# Patient Record
Sex: FEMALE | Race: ASIAN | NOT HISPANIC OR LATINO
[De-identification: names, ages, dates, MRNs, and addresses within clinical notes are randomized per-mention and may not be internally consistent; named-entity substitution may affect disease eponyms.]

---

## 2020-07-17 ENCOUNTER — LABORATORY RESULT (OUTPATIENT)
Age: 30
End: 2020-07-17

## 2020-07-17 ENCOUNTER — RESULT REVIEW (OUTPATIENT)
Age: 30
End: 2020-07-17

## 2020-07-17 ENCOUNTER — OUTPATIENT (OUTPATIENT)
Dept: OUTPATIENT SERVICES | Facility: HOSPITAL | Age: 30
LOS: 1 days | End: 2020-07-17
Payer: MEDICAID

## 2020-07-17 ENCOUNTER — APPOINTMENT (OUTPATIENT)
Dept: OBGYN | Facility: HOSPITAL | Age: 30
End: 2020-07-17

## 2020-07-17 ENCOUNTER — NON-APPOINTMENT (OUTPATIENT)
Age: 30
End: 2020-07-17

## 2020-07-17 VITALS
WEIGHT: 142 LBS | HEART RATE: 83 BPM | DIASTOLIC BLOOD PRESSURE: 65 MMHG | SYSTOLIC BLOOD PRESSURE: 120 MMHG | HEIGHT: 59 IN | TEMPERATURE: 97.8 F | BODY MASS INDEX: 28.63 KG/M2

## 2020-07-17 DIAGNOSIS — Z34.00 ENCOUNTER FOR SUPERVISION OF NORMAL FIRST PREGNANCY, UNSPECIFIED TRIMESTER: ICD-10-CM

## 2020-07-17 DIAGNOSIS — Z80.8 FAMILY HISTORY OF MALIGNANT NEOPLASM OF OTHER ORGANS OR SYSTEMS: ICD-10-CM

## 2020-07-17 DIAGNOSIS — Z78.9 OTHER SPECIFIED HEALTH STATUS: ICD-10-CM

## 2020-07-17 LAB
ALBUMIN SERPL ELPH-MCNC: 4 G/DL — SIGNIFICANT CHANGE UP (ref 3.3–5)
ALP SERPL-CCNC: 44 U/L — SIGNIFICANT CHANGE UP (ref 40–120)
ALT FLD-CCNC: 30 U/L — SIGNIFICANT CHANGE UP (ref 4–33)
ANION GAP SERPL CALC-SCNC: 13 MMO/L — SIGNIFICANT CHANGE UP (ref 7–14)
APPEARANCE UR: SIGNIFICANT CHANGE UP
AST SERPL-CCNC: 29 U/L — SIGNIFICANT CHANGE UP (ref 4–32)
BACTERIA # UR AUTO: HIGH
BASOPHILS # BLD AUTO: 0.03 K/UL — SIGNIFICANT CHANGE UP (ref 0–0.2)
BASOPHILS NFR BLD AUTO: 0.3 % — SIGNIFICANT CHANGE UP (ref 0–2)
BILIRUB SERPL-MCNC: 0.5 MG/DL — SIGNIFICANT CHANGE UP (ref 0.2–1.2)
BILIRUB UR-MCNC: NEGATIVE — SIGNIFICANT CHANGE UP
BLD GP AB SCN SERPL QL: NEGATIVE — SIGNIFICANT CHANGE UP
BLOOD UR QL VISUAL: NEGATIVE — SIGNIFICANT CHANGE UP
BUN SERPL-MCNC: 8 MG/DL — SIGNIFICANT CHANGE UP (ref 7–23)
CALCIUM SERPL-MCNC: 9.1 MG/DL — SIGNIFICANT CHANGE UP (ref 8.4–10.5)
CHLORIDE SERPL-SCNC: 102 MMOL/L — SIGNIFICANT CHANGE UP (ref 98–107)
CO2 SERPL-SCNC: 21 MMOL/L — LOW (ref 22–31)
COLOR SPEC: YELLOW — SIGNIFICANT CHANGE UP
CREAT SERPL-MCNC: 0.47 MG/DL — LOW (ref 0.5–1.3)
EOSINOPHIL # BLD AUTO: 0.14 K/UL — SIGNIFICANT CHANGE UP (ref 0–0.5)
EOSINOPHIL NFR BLD AUTO: 1.5 % — SIGNIFICANT CHANGE UP (ref 0–6)
EPI CELLS # UR: SIGNIFICANT CHANGE UP
GLUCOSE SERPL-MCNC: 53 MG/DL — LOW (ref 70–99)
GLUCOSE UR-MCNC: NEGATIVE — SIGNIFICANT CHANGE UP
HCT VFR BLD CALC: 35.9 % — SIGNIFICANT CHANGE UP (ref 34.5–45)
HGB BLD-MCNC: 12.3 G/DL — SIGNIFICANT CHANGE UP (ref 11.5–15.5)
HYALINE CASTS # UR AUTO: HIGH
IMM GRANULOCYTES NFR BLD AUTO: 0.4 % — SIGNIFICANT CHANGE UP (ref 0–1.5)
KETONES UR-MCNC: NEGATIVE — SIGNIFICANT CHANGE UP
LEUKOCYTE ESTERASE UR-ACNC: NEGATIVE — SIGNIFICANT CHANGE UP
LYMPHOCYTES # BLD AUTO: 2.5 K/UL — SIGNIFICANT CHANGE UP (ref 1–3.3)
LYMPHOCYTES # BLD AUTO: 26.5 % — SIGNIFICANT CHANGE UP (ref 13–44)
MCHC RBC-ENTMCNC: 28.7 PG — SIGNIFICANT CHANGE UP (ref 27–34)
MCHC RBC-ENTMCNC: 34.3 % — SIGNIFICANT CHANGE UP (ref 32–36)
MCV RBC AUTO: 83.9 FL — SIGNIFICANT CHANGE UP (ref 80–100)
MONOCYTES # BLD AUTO: 0.51 K/UL — SIGNIFICANT CHANGE UP (ref 0–0.9)
MONOCYTES NFR BLD AUTO: 5.4 % — SIGNIFICANT CHANGE UP (ref 2–14)
NEUTROPHILS # BLD AUTO: 6.22 K/UL — SIGNIFICANT CHANGE UP (ref 1.8–7.4)
NEUTROPHILS NFR BLD AUTO: 65.9 % — SIGNIFICANT CHANGE UP (ref 43–77)
NITRITE UR-MCNC: NEGATIVE — SIGNIFICANT CHANGE UP
NRBC # FLD: 0 K/UL — SIGNIFICANT CHANGE UP (ref 0–0)
PH UR: 7 — SIGNIFICANT CHANGE UP (ref 5–8)
PLATELET # BLD AUTO: 244 K/UL — SIGNIFICANT CHANGE UP (ref 150–400)
PMV BLD: 12 FL — SIGNIFICANT CHANGE UP (ref 7–13)
POTASSIUM SERPL-MCNC: 3.2 MMOL/L — LOW (ref 3.5–5.3)
POTASSIUM SERPL-SCNC: 3.2 MMOL/L — LOW (ref 3.5–5.3)
PROT SERPL-MCNC: 7.2 G/DL — SIGNIFICANT CHANGE UP (ref 6–8.3)
PROT UR-MCNC: 30 — SIGNIFICANT CHANGE UP
RBC # BLD: 4.28 M/UL — SIGNIFICANT CHANGE UP (ref 3.8–5.2)
RBC # FLD: 12.8 % — SIGNIFICANT CHANGE UP (ref 10.3–14.5)
RBC CASTS # UR COMP ASSIST: SIGNIFICANT CHANGE UP (ref 0–?)
RH IG SCN BLD-IMP: POSITIVE — SIGNIFICANT CHANGE UP
SODIUM SERPL-SCNC: 136 MMOL/L — SIGNIFICANT CHANGE UP (ref 135–145)
SP GR SPEC: 1.03 — SIGNIFICANT CHANGE UP (ref 1–1.04)
SQUAMOUS # UR AUTO: SIGNIFICANT CHANGE UP
URATE SERPL-MCNC: 2.8 MG/DL — SIGNIFICANT CHANGE UP (ref 2.5–7)
UROBILINOGEN FLD QL: NORMAL — SIGNIFICANT CHANGE UP
WBC # BLD: 9.44 K/UL — SIGNIFICANT CHANGE UP (ref 3.8–10.5)
WBC # FLD AUTO: 9.44 K/UL — SIGNIFICANT CHANGE UP (ref 3.8–10.5)
WBC UR QL: SIGNIFICANT CHANGE UP (ref 0–?)

## 2020-07-17 PROCEDURE — G0452: CPT

## 2020-07-18 LAB
24R-OH-CALCIDIOL SERPL-MCNC: 29 NG/ML — LOW (ref 30–80)
C TRACH RRNA SPEC QL NAA+PROBE: SIGNIFICANT CHANGE UP
HBV SURFACE AG SER-ACNC: NONREACTIVE — SIGNIFICANT CHANGE UP
HCV AB S/CO SERPL IA: 0.09 S/CO — SIGNIFICANT CHANGE UP (ref 0–0.99)
HCV AB SERPL-IMP: SIGNIFICANT CHANGE UP
HIV 1+2 AB+HIV1 P24 AG SERPL QL IA: SIGNIFICANT CHANGE UP
MUV AB SER-ACNC: POSITIVE — SIGNIFICANT CHANGE UP
MUV IGG FLD-ACNC: 99.7 AU/ML — SIGNIFICANT CHANGE UP
N GONORRHOEA RRNA SPEC QL NAA+PROBE: SIGNIFICANT CHANGE UP
RUBV IGG SER-ACNC: 18.1 INDEX — SIGNIFICANT CHANGE UP
RUBV IGG SER-IMP: POSITIVE — SIGNIFICANT CHANGE UP
SARS-COV-2 IGG SERPL QL IA: POSITIVE
SARS-COV-2 IGM SERPL IA-ACNC: 139 INDEX — HIGH
SPECIMEN SOURCE: SIGNIFICANT CHANGE UP
T PALLIDUM AB TITR SER: NEGATIVE — SIGNIFICANT CHANGE UP
VZV IGG FLD QL IA: 896.1 INDEX — SIGNIFICANT CHANGE UP
VZV IGG FLD QL IA: POSITIVE — SIGNIFICANT CHANGE UP

## 2020-07-19 LAB
CULTURE RESULTS: SIGNIFICANT CHANGE UP
GAMMA INTERFERON BACKGROUND BLD IA-ACNC: 0.05 IU/ML — SIGNIFICANT CHANGE UP
M TB IFN-G BLD-IMP: NEGATIVE — SIGNIFICANT CHANGE UP
M TB IFN-G CD4+ BCKGRND COR BLD-ACNC: 0.01 IU/ML — SIGNIFICANT CHANGE UP
M TB IFN-G CD4+CD8+ BCKGRND COR BLD-ACNC: 0.03 IU/ML — SIGNIFICANT CHANGE UP
QUANT TB PLUS MITOGEN MINUS NIL: 8.36 IU/ML — SIGNIFICANT CHANGE UP
SPECIMEN SOURCE: SIGNIFICANT CHANGE UP

## 2020-07-20 LAB
HGB A MFR BLD: 96.5 % — SIGNIFICANT CHANGE UP
HGB A2 MFR BLD: 2.9 % — SIGNIFICANT CHANGE UP (ref 2.4–3.5)
HGB ELECT COMMENT: SIGNIFICANT CHANGE UP
HGB F MFR BLD: < 1 % — SIGNIFICANT CHANGE UP (ref 0–1.5)
LEAD SERPL-MCNC: 1 UG/DL — SIGNIFICANT CHANGE UP (ref 0–4)

## 2020-07-22 LAB — CYTOLOGY SPEC DOC CYTO: SIGNIFICANT CHANGE UP

## 2020-07-23 ENCOUNTER — LABORATORY RESULT (OUTPATIENT)
Age: 30
End: 2020-07-23

## 2020-07-24 ENCOUNTER — OUTPATIENT (OUTPATIENT)
Dept: OUTPATIENT SERVICES | Facility: HOSPITAL | Age: 30
LOS: 1 days | End: 2020-07-24

## 2020-07-24 ENCOUNTER — ASOB RESULT (OUTPATIENT)
Age: 30
End: 2020-07-24

## 2020-07-24 ENCOUNTER — APPOINTMENT (OUTPATIENT)
Dept: OBGYN | Facility: HOSPITAL | Age: 30
End: 2020-07-24

## 2020-07-24 ENCOUNTER — NON-APPOINTMENT (OUTPATIENT)
Age: 30
End: 2020-07-24

## 2020-07-24 ENCOUNTER — APPOINTMENT (OUTPATIENT)
Dept: ANTEPARTUM | Facility: CLINIC | Age: 30
End: 2020-07-24
Payer: MEDICAID

## 2020-07-24 VITALS
HEART RATE: 82 BPM | BODY MASS INDEX: 28.88 KG/M2 | WEIGHT: 143 LBS | DIASTOLIC BLOOD PRESSURE: 64 MMHG | SYSTOLIC BLOOD PRESSURE: 108 MMHG

## 2020-07-24 PROCEDURE — 76813 OB US NUCHAL MEAS 1 GEST: CPT | Mod: 26

## 2020-07-24 PROCEDURE — 76801 OB US < 14 WKS SINGLE FETUS: CPT | Mod: 26

## 2020-07-25 LAB — CFTR MUT ANL BLD/T: NEGATIVE — SIGNIFICANT CHANGE UP

## 2020-07-29 LAB
1ST TRIMESTER DATA: SIGNIFICANT CHANGE UP
ADDENDUM DOC: SIGNIFICANT CHANGE UP
AFP SERPL-ACNC: SIGNIFICANT CHANGE UP
B-HCG FREE SERPL-MCNC: SIGNIFICANT CHANGE UP
CLINICAL BIOCHEMIST REVIEW: SIGNIFICANT CHANGE UP
CLINICAL BIOCHEMIST REVIEW: SIGNIFICANT CHANGE UP
DEMOGRAPHIC DATA: SIGNIFICANT CHANGE UP
NT: SIGNIFICANT CHANGE UP
PAPP-A SERPL-ACNC: SIGNIFICANT CHANGE UP
SCREEN-FOOTER: SIGNIFICANT CHANGE UP
SCREEN-RECOMMENDATIONS: SIGNIFICANT CHANGE UP

## 2020-08-07 DIAGNOSIS — Z34.92 ENCOUNTER FOR SUPERVISION OF NORMAL PREGNANCY, UNSPECIFIED, SECOND TRIMESTER: ICD-10-CM

## 2020-08-21 ENCOUNTER — LABORATORY RESULT (OUTPATIENT)
Age: 30
End: 2020-08-21

## 2020-08-21 ENCOUNTER — APPOINTMENT (OUTPATIENT)
Dept: OBGYN | Facility: HOSPITAL | Age: 30
End: 2020-08-21

## 2020-08-21 ENCOUNTER — NON-APPOINTMENT (OUTPATIENT)
Age: 30
End: 2020-08-21

## 2020-08-21 ENCOUNTER — OUTPATIENT (OUTPATIENT)
Dept: OUTPATIENT SERVICES | Facility: HOSPITAL | Age: 30
LOS: 1 days | End: 2020-08-21

## 2020-08-21 VITALS
SYSTOLIC BLOOD PRESSURE: 107 MMHG | WEIGHT: 146 LBS | DIASTOLIC BLOOD PRESSURE: 55 MMHG | HEART RATE: 89 BPM | TEMPERATURE: 97.8 F | BODY MASS INDEX: 29.49 KG/M2

## 2020-08-24 DIAGNOSIS — Z34.00 ENCOUNTER FOR SUPERVISION OF NORMAL FIRST PREGNANCY, UNSPECIFIED TRIMESTER: ICD-10-CM

## 2020-08-24 LAB
1ST TRIMESTER DATA: SIGNIFICANT CHANGE UP
2ND TRIMESTER DATA: SIGNIFICANT CHANGE UP
AFP SERPL-ACNC: SIGNIFICANT CHANGE UP
AFP SERPL-ACNC: SIGNIFICANT CHANGE UP
B-HCG FREE SERPL-MCNC: SIGNIFICANT CHANGE UP
B-HCG FREE SERPL-MCNC: SIGNIFICANT CHANGE UP
CLINICAL BIOCHEMIST REVIEW: SIGNIFICANT CHANGE UP
DEMOGRAPHIC DATA: SIGNIFICANT CHANGE UP
INHIBIN A SERPL-MCNC: SIGNIFICANT CHANGE UP
NT: SIGNIFICANT CHANGE UP
PAPP-A SERPL-ACNC: SIGNIFICANT CHANGE UP
SCREEN-FOOTER: SIGNIFICANT CHANGE UP
U ESTRIOL SERPL-SCNC: SIGNIFICANT CHANGE UP

## 2020-09-25 ENCOUNTER — ASOB RESULT (OUTPATIENT)
Age: 30
End: 2020-09-25

## 2020-09-25 ENCOUNTER — APPOINTMENT (OUTPATIENT)
Dept: ANTEPARTUM | Facility: CLINIC | Age: 30
End: 2020-09-25

## 2020-10-09 ENCOUNTER — APPOINTMENT (OUTPATIENT)
Dept: OBGYN | Facility: HOSPITAL | Age: 30
End: 2020-10-09

## 2020-10-09 ENCOUNTER — OUTPATIENT (OUTPATIENT)
Dept: OUTPATIENT SERVICES | Facility: HOSPITAL | Age: 30
LOS: 1 days | End: 2020-10-09

## 2020-10-09 ENCOUNTER — LABORATORY RESULT (OUTPATIENT)
Age: 30
End: 2020-10-09

## 2020-10-09 ENCOUNTER — NON-APPOINTMENT (OUTPATIENT)
Age: 30
End: 2020-10-09

## 2020-10-09 VITALS
HEART RATE: 97 BPM | DIASTOLIC BLOOD PRESSURE: 65 MMHG | WEIGHT: 157 LBS | BODY MASS INDEX: 31.71 KG/M2 | SYSTOLIC BLOOD PRESSURE: 115 MMHG | TEMPERATURE: 97 F

## 2020-10-09 LAB
POTASSIUM SERPL-MCNC: 3.6 MMOL/L — SIGNIFICANT CHANGE UP (ref 3.5–5.3)
POTASSIUM SERPL-SCNC: 3.6 MMOL/L — SIGNIFICANT CHANGE UP (ref 3.5–5.3)

## 2020-10-13 DIAGNOSIS — Z34.92 ENCOUNTER FOR SUPERVISION OF NORMAL PREGNANCY, UNSPECIFIED, SECOND TRIMESTER: ICD-10-CM

## 2020-10-30 ENCOUNTER — APPOINTMENT (OUTPATIENT)
Dept: OBGYN | Facility: HOSPITAL | Age: 30
End: 2020-10-30

## 2020-11-06 ENCOUNTER — APPOINTMENT (OUTPATIENT)
Dept: OBGYN | Facility: HOSPITAL | Age: 30
End: 2020-11-06

## 2020-11-10 ENCOUNTER — APPOINTMENT (OUTPATIENT)
Dept: OBGYN | Facility: HOSPITAL | Age: 30
End: 2020-11-10

## 2020-11-11 ENCOUNTER — LABORATORY RESULT (OUTPATIENT)
Age: 30
End: 2020-11-11

## 2020-11-11 ENCOUNTER — APPOINTMENT (OUTPATIENT)
Dept: OBGYN | Facility: HOSPITAL | Age: 30
End: 2020-11-11

## 2020-11-11 ENCOUNTER — OUTPATIENT (OUTPATIENT)
Dept: OUTPATIENT SERVICES | Facility: HOSPITAL | Age: 30
LOS: 1 days | End: 2020-11-11

## 2020-11-11 ENCOUNTER — NON-APPOINTMENT (OUTPATIENT)
Age: 30
End: 2020-11-11

## 2020-11-11 VITALS — TEMPERATURE: 98.7 F

## 2020-11-11 VITALS — DIASTOLIC BLOOD PRESSURE: 65 MMHG | WEIGHT: 164 LBS | BODY MASS INDEX: 33.12 KG/M2 | SYSTOLIC BLOOD PRESSURE: 118 MMHG

## 2020-11-11 VITALS — HEART RATE: 84 BPM

## 2020-11-11 LAB
BASOPHILS # BLD AUTO: 0.02 K/UL — SIGNIFICANT CHANGE UP (ref 0–0.2)
BASOPHILS NFR BLD AUTO: 0.2 % — SIGNIFICANT CHANGE UP (ref 0–2)
EOSINOPHIL # BLD AUTO: 0.11 K/UL — SIGNIFICANT CHANGE UP (ref 0–0.5)
EOSINOPHIL NFR BLD AUTO: 1.1 % — SIGNIFICANT CHANGE UP (ref 0–6)
GLUCOSE PRE/P GLC SERPL-SCNC: 114 — SIGNIFICANT CHANGE UP (ref 65–115)
HCT VFR BLD CALC: 33.5 % — LOW (ref 34.5–45)
HGB BLD-MCNC: 10.9 G/DL — LOW (ref 11.5–15.5)
IMM GRANULOCYTES NFR BLD AUTO: 0.9 % — SIGNIFICANT CHANGE UP (ref 0–1.5)
LYMPHOCYTES # BLD AUTO: 1.8 K/UL — SIGNIFICANT CHANGE UP (ref 1–3.3)
LYMPHOCYTES # BLD AUTO: 18.3 % — SIGNIFICANT CHANGE UP (ref 13–44)
MCHC RBC-ENTMCNC: 28.5 PG — SIGNIFICANT CHANGE UP (ref 27–34)
MCHC RBC-ENTMCNC: 32.5 % — SIGNIFICANT CHANGE UP (ref 32–36)
MCV RBC AUTO: 87.5 FL — SIGNIFICANT CHANGE UP (ref 80–100)
MONOCYTES # BLD AUTO: 0.59 K/UL — SIGNIFICANT CHANGE UP (ref 0–0.9)
MONOCYTES NFR BLD AUTO: 6 % — SIGNIFICANT CHANGE UP (ref 2–14)
NEUTROPHILS # BLD AUTO: 7.25 K/UL — SIGNIFICANT CHANGE UP (ref 1.8–7.4)
NEUTROPHILS NFR BLD AUTO: 73.5 % — SIGNIFICANT CHANGE UP (ref 43–77)
NRBC # FLD: 0 K/UL — SIGNIFICANT CHANGE UP (ref 0–0)
PLATELET # BLD AUTO: 206 K/UL — SIGNIFICANT CHANGE UP (ref 150–400)
PMV BLD: 11.7 FL — SIGNIFICANT CHANGE UP (ref 7–13)
RBC # BLD: 3.83 M/UL — SIGNIFICANT CHANGE UP (ref 3.8–5.2)
RBC # FLD: 13.2 % — SIGNIFICANT CHANGE UP (ref 10.3–14.5)
T4 FREE SERPL-MCNC: 1.09 NG/DL — SIGNIFICANT CHANGE UP (ref 0.9–1.8)
TSH SERPL-MCNC: 0.78 UIU/ML — SIGNIFICANT CHANGE UP (ref 0.27–4.2)
WBC # BLD: 9.86 K/UL — SIGNIFICANT CHANGE UP (ref 3.8–10.5)
WBC # FLD AUTO: 9.86 K/UL — SIGNIFICANT CHANGE UP (ref 3.8–10.5)

## 2020-11-11 RX ORDER — MULTIVIT-MIN/FOLIC/VIT K/LYCOP 400-300MCG
28-0.8 TABLET ORAL DAILY
Qty: 30 | Refills: 6 | Status: ACTIVE | COMMUNITY
Start: 2020-11-11

## 2020-11-12 DIAGNOSIS — Z34.00 ENCOUNTER FOR SUPERVISION OF NORMAL FIRST PREGNANCY, UNSPECIFIED TRIMESTER: ICD-10-CM

## 2020-11-12 LAB
24R-OH-CALCIDIOL SERPL-MCNC: 29.4 NG/ML — LOW (ref 30–80)
T PALLIDUM AB TITR SER: NEGATIVE — SIGNIFICANT CHANGE UP

## 2020-11-13 ENCOUNTER — APPOINTMENT (OUTPATIENT)
Dept: ANTEPARTUM | Facility: CLINIC | Age: 30
End: 2020-11-13
Payer: COMMERCIAL

## 2020-11-13 ENCOUNTER — ASOB RESULT (OUTPATIENT)
Age: 30
End: 2020-11-13

## 2020-11-13 PROCEDURE — 76816 OB US FOLLOW-UP PER FETUS: CPT | Mod: 26

## 2020-11-13 PROCEDURE — 76819 FETAL BIOPHYS PROFIL W/O NST: CPT | Mod: 26

## 2020-11-24 RX ORDER — FAMOTIDINE 20 MG/1
20 TABLET, FILM COATED ORAL DAILY
Qty: 30 | Refills: 2 | Status: COMPLETED | COMMUNITY
Start: 2020-11-24 | End: 2021-02-22

## 2020-12-02 ENCOUNTER — APPOINTMENT (OUTPATIENT)
Dept: OBGYN | Facility: HOSPITAL | Age: 30
End: 2020-12-02

## 2020-12-04 ENCOUNTER — APPOINTMENT (OUTPATIENT)
Dept: ANTEPARTUM | Facility: CLINIC | Age: 30
End: 2020-12-04
Payer: MEDICAID

## 2020-12-04 ENCOUNTER — APPOINTMENT (OUTPATIENT)
Dept: OBGYN | Facility: HOSPITAL | Age: 30
End: 2020-12-04

## 2020-12-04 ENCOUNTER — NON-APPOINTMENT (OUTPATIENT)
Age: 30
End: 2020-12-04

## 2020-12-04 ENCOUNTER — MED ADMIN CHARGE (OUTPATIENT)
Age: 30
End: 2020-12-04

## 2020-12-04 ENCOUNTER — ASOB RESULT (OUTPATIENT)
Age: 30
End: 2020-12-04

## 2020-12-04 ENCOUNTER — OUTPATIENT (OUTPATIENT)
Dept: OUTPATIENT SERVICES | Facility: HOSPITAL | Age: 30
LOS: 1 days | End: 2020-12-04

## 2020-12-04 VITALS
BODY MASS INDEX: 33.87 KG/M2 | HEIGHT: 59 IN | HEART RATE: 98 BPM | DIASTOLIC BLOOD PRESSURE: 64 MMHG | TEMPERATURE: 97.5 F | SYSTOLIC BLOOD PRESSURE: 124 MMHG | WEIGHT: 168 LBS

## 2020-12-04 PROCEDURE — 76819 FETAL BIOPHYS PROFIL W/O NST: CPT | Mod: 26

## 2020-12-04 PROCEDURE — 76816 OB US FOLLOW-UP PER FETUS: CPT | Mod: 26

## 2020-12-08 DIAGNOSIS — Z23 ENCOUNTER FOR IMMUNIZATION: ICD-10-CM

## 2020-12-08 DIAGNOSIS — Z34.03 ENCOUNTER FOR SUPERVISION OF NORMAL FIRST PREGNANCY, THIRD TRIMESTER: ICD-10-CM

## 2020-12-17 ENCOUNTER — APPOINTMENT (OUTPATIENT)
Dept: OBGYN | Facility: HOSPITAL | Age: 30
End: 2020-12-17

## 2020-12-29 ENCOUNTER — APPOINTMENT (OUTPATIENT)
Dept: OBGYN | Facility: HOSPITAL | Age: 30
End: 2020-12-29

## 2020-12-29 ENCOUNTER — NON-APPOINTMENT (OUTPATIENT)
Age: 30
End: 2020-12-29

## 2021-01-07 ENCOUNTER — RESULT REVIEW (OUTPATIENT)
Age: 31
End: 2021-01-07

## 2021-01-07 ENCOUNTER — OUTPATIENT (OUTPATIENT)
Dept: OUTPATIENT SERVICES | Facility: HOSPITAL | Age: 31
LOS: 1 days | End: 2021-01-07

## 2021-01-07 ENCOUNTER — APPOINTMENT (OUTPATIENT)
Dept: OBGYN | Facility: HOSPITAL | Age: 31
End: 2021-01-07

## 2021-01-07 ENCOUNTER — NON-APPOINTMENT (OUTPATIENT)
Age: 31
End: 2021-01-07

## 2021-01-07 VITALS
HEIGHT: 59 IN | HEART RATE: 93 BPM | BODY MASS INDEX: 34.07 KG/M2 | WEIGHT: 169 LBS | DIASTOLIC BLOOD PRESSURE: 68 MMHG | TEMPERATURE: 97.3 F | SYSTOLIC BLOOD PRESSURE: 117 MMHG

## 2021-01-08 DIAGNOSIS — Z34.03 ENCOUNTER FOR SUPERVISION OF NORMAL FIRST PREGNANCY, THIRD TRIMESTER: ICD-10-CM

## 2021-01-09 LAB
C TRACH RRNA SPEC QL NAA+PROBE: SIGNIFICANT CHANGE UP
CULTURE RESULTS: SIGNIFICANT CHANGE UP
N GONORRHOEA RRNA SPEC QL NAA+PROBE: SIGNIFICANT CHANGE UP
SPECIMEN SOURCE: SIGNIFICANT CHANGE UP
SPECIMEN SOURCE: SIGNIFICANT CHANGE UP

## 2021-01-14 ENCOUNTER — APPOINTMENT (OUTPATIENT)
Dept: OBGYN | Facility: HOSPITAL | Age: 31
End: 2021-01-14

## 2021-01-14 ENCOUNTER — RESULT REVIEW (OUTPATIENT)
Age: 31
End: 2021-01-14

## 2021-01-14 ENCOUNTER — OUTPATIENT (OUTPATIENT)
Dept: OUTPATIENT SERVICES | Facility: HOSPITAL | Age: 31
LOS: 1 days | End: 2021-01-14

## 2021-01-14 ENCOUNTER — NON-APPOINTMENT (OUTPATIENT)
Age: 31
End: 2021-01-14

## 2021-01-14 VITALS
SYSTOLIC BLOOD PRESSURE: 111 MMHG | HEART RATE: 100 BPM | BODY MASS INDEX: 34.74 KG/M2 | DIASTOLIC BLOOD PRESSURE: 69 MMHG | WEIGHT: 172 LBS | TEMPERATURE: 99 F

## 2021-01-14 LAB
BASOPHILS # BLD AUTO: 0.03 K/UL — SIGNIFICANT CHANGE UP (ref 0–0.2)
BASOPHILS NFR BLD AUTO: 0.3 % — SIGNIFICANT CHANGE UP (ref 0–2)
EOSINOPHIL # BLD AUTO: 0.09 K/UL — SIGNIFICANT CHANGE UP (ref 0–0.5)
EOSINOPHIL NFR BLD AUTO: 0.9 % — SIGNIFICANT CHANGE UP (ref 0–6)
HCT VFR BLD CALC: 38.5 % — SIGNIFICANT CHANGE UP (ref 34.5–45)
HGB BLD-MCNC: 12.1 G/DL — SIGNIFICANT CHANGE UP (ref 11.5–15.5)
IANC: 7.22 K/UL — SIGNIFICANT CHANGE UP (ref 1.5–8.5)
IMM GRANULOCYTES NFR BLD AUTO: 1.7 % — HIGH (ref 0–1.5)
LYMPHOCYTES # BLD AUTO: 2.31 K/UL — SIGNIFICANT CHANGE UP (ref 1–3.3)
LYMPHOCYTES # BLD AUTO: 21.9 % — SIGNIFICANT CHANGE UP (ref 13–44)
MCHC RBC-ENTMCNC: 28.1 PG — SIGNIFICANT CHANGE UP (ref 27–34)
MCHC RBC-ENTMCNC: 31.4 GM/DL — LOW (ref 32–36)
MCV RBC AUTO: 89.3 FL — SIGNIFICANT CHANGE UP (ref 80–100)
MONOCYTES # BLD AUTO: 0.73 K/UL — SIGNIFICANT CHANGE UP (ref 0–0.9)
MONOCYTES NFR BLD AUTO: 6.9 % — SIGNIFICANT CHANGE UP (ref 2–14)
NEUTROPHILS # BLD AUTO: 7.22 K/UL — SIGNIFICANT CHANGE UP (ref 1.8–7.4)
NEUTROPHILS NFR BLD AUTO: 68.3 % — SIGNIFICANT CHANGE UP (ref 43–77)
NRBC # BLD: 0 /100 WBCS — SIGNIFICANT CHANGE UP
NRBC # FLD: 0 K/UL — SIGNIFICANT CHANGE UP
PLATELET # BLD AUTO: 206 K/UL — SIGNIFICANT CHANGE UP (ref 150–400)
RBC # BLD: 4.31 M/UL — SIGNIFICANT CHANGE UP (ref 3.8–5.2)
RBC # FLD: 13.8 % — SIGNIFICANT CHANGE UP (ref 10.3–14.5)
WBC # BLD: 10.56 K/UL — HIGH (ref 3.8–10.5)
WBC # FLD AUTO: 10.56 K/UL — HIGH (ref 3.8–10.5)

## 2021-01-15 LAB
SARS-COV-2 IGG SERPL QL IA: POSITIVE
SARS-COV-2 IGM SERPL IA-ACNC: 26 INDEX — HIGH

## 2021-01-21 ENCOUNTER — APPOINTMENT (OUTPATIENT)
Dept: OBGYN | Facility: HOSPITAL | Age: 31
End: 2021-01-21

## 2021-01-21 ENCOUNTER — ASOB RESULT (OUTPATIENT)
Age: 31
End: 2021-01-21

## 2021-01-21 ENCOUNTER — APPOINTMENT (OUTPATIENT)
Dept: ANTEPARTUM | Facility: CLINIC | Age: 31
End: 2021-01-21
Payer: MEDICAID

## 2021-01-21 ENCOUNTER — OUTPATIENT (OUTPATIENT)
Dept: OUTPATIENT SERVICES | Facility: HOSPITAL | Age: 31
LOS: 1 days | End: 2021-01-21

## 2021-01-21 VITALS
HEIGHT: 59 IN | TEMPERATURE: 97.3 F | BODY MASS INDEX: 35.68 KG/M2 | DIASTOLIC BLOOD PRESSURE: 67 MMHG | SYSTOLIC BLOOD PRESSURE: 110 MMHG | WEIGHT: 177 LBS | HEART RATE: 97 BPM

## 2021-01-21 PROCEDURE — 76819 FETAL BIOPHYS PROFIL W/O NST: CPT | Mod: 26

## 2021-01-21 PROCEDURE — 76816 OB US FOLLOW-UP PER FETUS: CPT | Mod: 26

## 2021-01-26 DIAGNOSIS — Z34.03 ENCOUNTER FOR SUPERVISION OF NORMAL FIRST PREGNANCY, THIRD TRIMESTER: ICD-10-CM

## 2021-01-26 DIAGNOSIS — Z34.00 ENCOUNTER FOR SUPERVISION OF NORMAL FIRST PREGNANCY, UNSPECIFIED TRIMESTER: ICD-10-CM

## 2021-01-27 ENCOUNTER — NON-APPOINTMENT (OUTPATIENT)
Age: 31
End: 2021-01-27

## 2021-01-27 ENCOUNTER — OUTPATIENT (OUTPATIENT)
Dept: OUTPATIENT SERVICES | Facility: HOSPITAL | Age: 31
LOS: 1 days | End: 2021-01-27

## 2021-01-27 ENCOUNTER — APPOINTMENT (OUTPATIENT)
Dept: OBGYN | Facility: HOSPITAL | Age: 31
End: 2021-01-27

## 2021-01-27 VITALS — SYSTOLIC BLOOD PRESSURE: 103 MMHG | BODY MASS INDEX: 36.36 KG/M2 | DIASTOLIC BLOOD PRESSURE: 60 MMHG | WEIGHT: 180 LBS

## 2021-01-27 VITALS — TEMPERATURE: 97.2 F

## 2021-01-28 ENCOUNTER — NON-APPOINTMENT (OUTPATIENT)
Age: 31
End: 2021-01-28

## 2021-01-28 DIAGNOSIS — Z34.03 ENCOUNTER FOR SUPERVISION OF NORMAL FIRST PREGNANCY, THIRD TRIMESTER: ICD-10-CM

## 2021-01-28 DIAGNOSIS — U07.1 COVID-19: ICD-10-CM

## 2021-02-02 ENCOUNTER — APPOINTMENT (OUTPATIENT)
Dept: DISASTER EMERGENCY | Facility: CLINIC | Age: 31
End: 2021-02-02

## 2021-02-03 ENCOUNTER — APPOINTMENT (OUTPATIENT)
Dept: ANTEPARTUM | Facility: CLINIC | Age: 31
End: 2021-02-03
Payer: SELF-PAY

## 2021-02-03 ENCOUNTER — OUTPATIENT (OUTPATIENT)
Dept: OUTPATIENT SERVICES | Facility: HOSPITAL | Age: 31
LOS: 1 days | End: 2021-02-03

## 2021-02-03 ENCOUNTER — NON-APPOINTMENT (OUTPATIENT)
Age: 31
End: 2021-02-03

## 2021-02-03 ENCOUNTER — ASOB RESULT (OUTPATIENT)
Age: 31
End: 2021-02-03

## 2021-02-03 ENCOUNTER — APPOINTMENT (OUTPATIENT)
Dept: ANTEPARTUM | Facility: CLINIC | Age: 31
End: 2021-02-03

## 2021-02-03 ENCOUNTER — APPOINTMENT (OUTPATIENT)
Dept: ANTEPARTUM | Facility: HOSPITAL | Age: 31
End: 2021-02-03

## 2021-02-03 ENCOUNTER — APPOINTMENT (OUTPATIENT)
Dept: OBGYN | Facility: HOSPITAL | Age: 31
End: 2021-02-03

## 2021-02-03 VITALS
BODY MASS INDEX: 36.36 KG/M2 | HEART RATE: 98 BPM | TEMPERATURE: 97.3 F | WEIGHT: 180 LBS | SYSTOLIC BLOOD PRESSURE: 120 MMHG | DIASTOLIC BLOOD PRESSURE: 64 MMHG

## 2021-02-03 DIAGNOSIS — Z20.822 CONTACT WITH AND (SUSPECTED) EXPOSURE TO COVID-19: ICD-10-CM

## 2021-02-03 LAB — SARS-COV-2 RNA SPEC QL NAA+PROBE: SIGNIFICANT CHANGE UP

## 2021-02-03 PROCEDURE — 76818 FETAL BIOPHYS PROFILE W/NST: CPT | Mod: 26

## 2021-02-04 ENCOUNTER — INPATIENT (INPATIENT)
Facility: HOSPITAL | Age: 31
LOS: 2 days | Discharge: HOME | End: 2021-02-07
Attending: SPECIALIST | Admitting: SPECIALIST
Payer: MEDICAID

## 2021-02-04 ENCOUNTER — APPOINTMENT (OUTPATIENT)
Dept: OBGYN | Facility: HOSPITAL | Age: 31
End: 2021-02-04

## 2021-02-04 VITALS — TEMPERATURE: 99 F

## 2021-02-04 DIAGNOSIS — O48.0 POST-TERM PREGNANCY: ICD-10-CM

## 2021-02-04 DIAGNOSIS — Z34.00 ENCOUNTER FOR SUPERVISION OF NORMAL FIRST PREGNANCY, UNSPECIFIED TRIMESTER: ICD-10-CM

## 2021-02-04 RX ORDER — CITRIC ACID/SODIUM CITRATE 300-500 MG
15 SOLUTION, ORAL ORAL EVERY 6 HOURS
Refills: 0 | Status: DISCONTINUED | OUTPATIENT
Start: 2021-02-04 | End: 2021-02-05

## 2021-02-04 RX ORDER — OXYTOCIN 10 UNIT/ML
333.33 VIAL (ML) INJECTION
Qty: 20 | Refills: 0 | Status: DISCONTINUED | OUTPATIENT
Start: 2021-02-04 | End: 2021-02-05

## 2021-02-04 RX ORDER — SODIUM CHLORIDE 9 MG/ML
1000 INJECTION, SOLUTION INTRAVENOUS
Refills: 0 | Status: DISCONTINUED | OUTPATIENT
Start: 2021-02-04 | End: 2021-02-05

## 2021-02-04 NOTE — OB PROVIDER H&P - ASSESSMENT
29yo  @41w0d presents for scheduled late term induction of labor.  - admit to L&D  - routine labs  - COVID (-) outpt  - IVF  - EFM, [place]  - IOL w/ PO cytotec    D/W Dr. Rory Harris, PGY-1

## 2021-02-04 NOTE — OB PROVIDER H&P - HISTORY OF PRESENT ILLNESS
31yo  @41w0d presents for scheduled late term induction of labor.  +FM. -VB. -LOF. -Ctx.  EFW: 3700  GBS: (-)    OBHx: , uncomplicated  GynHx: denies hx of fibroids/ovarian cysts/STDs/abnml pap smears  PMHx: denies  [place] [place]  Meds: PNVs  All: NKDA  Psych: denies hx of anxiety and depression

## 2021-02-05 DIAGNOSIS — Z90.49 ACQUIRED ABSENCE OF OTHER SPECIFIED PARTS OF DIGESTIVE TRACT: Chronic | ICD-10-CM

## 2021-02-05 LAB
BASOPHILS # BLD AUTO: 0.03 K/UL — SIGNIFICANT CHANGE UP (ref 0–0.2)
BASOPHILS NFR BLD AUTO: 0.3 % — SIGNIFICANT CHANGE UP (ref 0–2)
BLD GP AB SCN SERPL QL: NEGATIVE — SIGNIFICANT CHANGE UP
EOSINOPHIL # BLD AUTO: 0.05 K/UL — SIGNIFICANT CHANGE UP (ref 0–0.5)
EOSINOPHIL NFR BLD AUTO: 0.5 % — SIGNIFICANT CHANGE UP (ref 0–6)
HCT VFR BLD CALC: 39.3 % — SIGNIFICANT CHANGE UP (ref 34.5–45)
HGB BLD-MCNC: 12.1 G/DL — SIGNIFICANT CHANGE UP (ref 11.5–15.5)
IANC: 7.55 K/UL — SIGNIFICANT CHANGE UP (ref 1.5–8.5)
IMM GRANULOCYTES NFR BLD AUTO: 0.9 % — SIGNIFICANT CHANGE UP (ref 0–1.5)
LYMPHOCYTES # BLD AUTO: 1.91 K/UL — SIGNIFICANT CHANGE UP (ref 1–3.3)
LYMPHOCYTES # BLD AUTO: 18.6 % — SIGNIFICANT CHANGE UP (ref 13–44)
MCHC RBC-ENTMCNC: 28.1 PG — SIGNIFICANT CHANGE UP (ref 27–34)
MCHC RBC-ENTMCNC: 30.8 GM/DL — LOW (ref 32–36)
MCV RBC AUTO: 91.4 FL — SIGNIFICANT CHANGE UP (ref 80–100)
MONOCYTES # BLD AUTO: 0.65 K/UL — SIGNIFICANT CHANGE UP (ref 0–0.9)
MONOCYTES NFR BLD AUTO: 6.3 % — SIGNIFICANT CHANGE UP (ref 2–14)
NEUTROPHILS # BLD AUTO: 7.55 K/UL — HIGH (ref 1.8–7.4)
NEUTROPHILS NFR BLD AUTO: 73.4 % — SIGNIFICANT CHANGE UP (ref 43–77)
NRBC # BLD: 0 /100 WBCS — SIGNIFICANT CHANGE UP
NRBC # FLD: 0 K/UL — SIGNIFICANT CHANGE UP
PLATELET # BLD AUTO: 217 K/UL — SIGNIFICANT CHANGE UP (ref 150–400)
RBC # BLD: 4.3 M/UL — SIGNIFICANT CHANGE UP (ref 3.8–5.2)
RBC # FLD: 14.7 % — HIGH (ref 10.3–14.5)
RH IG SCN BLD-IMP: POSITIVE — SIGNIFICANT CHANGE UP
SARS-COV-2 IGG SERPL QL IA: NEGATIVE — SIGNIFICANT CHANGE UP
SARS-COV-2 IGM SERPL IA-ACNC: 0.38 INDEX — SIGNIFICANT CHANGE UP
WBC # BLD: 10.28 K/UL — SIGNIFICANT CHANGE UP (ref 3.8–10.5)
WBC # FLD AUTO: 10.28 K/UL — SIGNIFICANT CHANGE UP (ref 3.8–10.5)

## 2021-02-05 RX ORDER — OXYTOCIN 10 UNIT/ML
333.33 VIAL (ML) INJECTION
Qty: 20 | Refills: 0 | Status: DISCONTINUED | OUTPATIENT
Start: 2021-02-05 | End: 2021-02-06

## 2021-02-05 RX ORDER — INFLUENZA VIRUS VACCINE 15; 15; 15; 15 UG/.5ML; UG/.5ML; UG/.5ML; UG/.5ML
0.5 SUSPENSION INTRAMUSCULAR ONCE
Refills: 0 | Status: DISCONTINUED | OUTPATIENT
Start: 2021-02-05 | End: 2021-02-07

## 2021-02-05 RX ORDER — SODIUM CHLORIDE 9 MG/ML
1000 INJECTION, SOLUTION INTRAVENOUS
Refills: 0 | Status: DISCONTINUED | OUTPATIENT
Start: 2021-02-05 | End: 2021-02-06

## 2021-02-05 RX ORDER — OXYTOCIN 10 UNIT/ML
2 VIAL (ML) INJECTION
Qty: 30 | Refills: 0 | Status: DISCONTINUED | OUTPATIENT
Start: 2021-02-05 | End: 2021-02-06

## 2021-02-05 RX ORDER — CITRIC ACID/SODIUM CITRATE 300-500 MG
15 SOLUTION, ORAL ORAL EVERY 6 HOURS
Refills: 0 | Status: DISCONTINUED | OUTPATIENT
Start: 2021-02-05 | End: 2021-02-06

## 2021-02-05 RX ADMIN — Medication 2 MILLIUNIT(S)/MIN: at 23:14

## 2021-02-06 LAB — T PALLIDUM AB TITR SER: NEGATIVE — SIGNIFICANT CHANGE UP

## 2021-02-06 PROCEDURE — 59409 OBSTETRICAL CARE: CPT | Mod: U9,UB,GC

## 2021-02-06 RX ORDER — SODIUM CHLORIDE 9 MG/ML
3 INJECTION INTRAMUSCULAR; INTRAVENOUS; SUBCUTANEOUS EVERY 8 HOURS
Refills: 0 | Status: DISCONTINUED | OUTPATIENT
Start: 2021-02-06 | End: 2021-02-07

## 2021-02-06 RX ORDER — LANOLIN
1 OINTMENT (GRAM) TOPICAL EVERY 6 HOURS
Refills: 0 | Status: DISCONTINUED | OUTPATIENT
Start: 2021-02-06 | End: 2021-02-07

## 2021-02-06 RX ORDER — KETOROLAC TROMETHAMINE 30 MG/ML
30 SYRINGE (ML) INJECTION ONCE
Refills: 0 | Status: DISCONTINUED | OUTPATIENT
Start: 2021-02-06 | End: 2021-02-06

## 2021-02-06 RX ORDER — OXYCODONE HYDROCHLORIDE 5 MG/1
5 TABLET ORAL
Refills: 0 | Status: DISCONTINUED | OUTPATIENT
Start: 2021-02-06 | End: 2021-02-07

## 2021-02-06 RX ORDER — AER TRAVELER 0.5 G/1
1 SOLUTION RECTAL; TOPICAL EVERY 4 HOURS
Refills: 0 | Status: DISCONTINUED | OUTPATIENT
Start: 2021-02-06 | End: 2021-02-07

## 2021-02-06 RX ORDER — SODIUM CHLORIDE 9 MG/ML
1000 INJECTION, SOLUTION INTRAVENOUS ONCE
Refills: 0 | Status: COMPLETED | OUTPATIENT
Start: 2021-02-06 | End: 2021-02-06

## 2021-02-06 RX ORDER — OXYCODONE HYDROCHLORIDE 5 MG/1
5 TABLET ORAL ONCE
Refills: 0 | Status: DISCONTINUED | OUTPATIENT
Start: 2021-02-06 | End: 2021-02-07

## 2021-02-06 RX ORDER — BENZOCAINE 10 %
1 GEL (GRAM) MUCOUS MEMBRANE EVERY 6 HOURS
Refills: 0 | Status: DISCONTINUED | OUTPATIENT
Start: 2021-02-06 | End: 2021-02-07

## 2021-02-06 RX ORDER — SIMETHICONE 80 MG/1
80 TABLET, CHEWABLE ORAL EVERY 4 HOURS
Refills: 0 | Status: DISCONTINUED | OUTPATIENT
Start: 2021-02-06 | End: 2021-02-07

## 2021-02-06 RX ORDER — IBUPROFEN 200 MG
600 TABLET ORAL EVERY 6 HOURS
Refills: 0 | Status: COMPLETED | OUTPATIENT
Start: 2021-02-06 | End: 2022-01-05

## 2021-02-06 RX ORDER — DIPHENHYDRAMINE HCL 50 MG
25 CAPSULE ORAL EVERY 6 HOURS
Refills: 0 | Status: DISCONTINUED | OUTPATIENT
Start: 2021-02-06 | End: 2021-02-07

## 2021-02-06 RX ORDER — MAGNESIUM HYDROXIDE 400 MG/1
30 TABLET, CHEWABLE ORAL
Refills: 0 | Status: DISCONTINUED | OUTPATIENT
Start: 2021-02-06 | End: 2021-02-07

## 2021-02-06 RX ORDER — ERTAPENEM SODIUM 1 G/1
1000 INJECTION, POWDER, LYOPHILIZED, FOR SOLUTION INTRAMUSCULAR; INTRAVENOUS ONCE
Refills: 0 | Status: DISCONTINUED | OUTPATIENT
Start: 2021-02-06 | End: 2021-02-06

## 2021-02-06 RX ORDER — PRAMOXINE HYDROCHLORIDE 150 MG/15G
1 AEROSOL, FOAM RECTAL EVERY 4 HOURS
Refills: 0 | Status: DISCONTINUED | OUTPATIENT
Start: 2021-02-06 | End: 2021-02-07

## 2021-02-06 RX ORDER — ACETAMINOPHEN 500 MG
975 TABLET ORAL
Refills: 0 | Status: DISCONTINUED | OUTPATIENT
Start: 2021-02-06 | End: 2021-02-07

## 2021-02-06 RX ORDER — DIBUCAINE 1 %
1 OINTMENT (GRAM) RECTAL EVERY 6 HOURS
Refills: 0 | Status: DISCONTINUED | OUTPATIENT
Start: 2021-02-06 | End: 2021-02-07

## 2021-02-06 RX ORDER — ERTAPENEM SODIUM 1 G/1
1000 INJECTION, POWDER, LYOPHILIZED, FOR SOLUTION INTRAMUSCULAR; INTRAVENOUS ONCE
Refills: 0 | Status: COMPLETED | OUTPATIENT
Start: 2021-02-06 | End: 2021-02-06

## 2021-02-06 RX ORDER — TETANUS TOXOID, REDUCED DIPHTHERIA TOXOID AND ACELLULAR PERTUSSIS VACCINE, ADSORBED 5; 2.5; 8; 8; 2.5 [IU]/.5ML; [IU]/.5ML; UG/.5ML; UG/.5ML; UG/.5ML
0.5 SUSPENSION INTRAMUSCULAR ONCE
Refills: 0 | Status: DISCONTINUED | OUTPATIENT
Start: 2021-02-06 | End: 2021-02-07

## 2021-02-06 RX ORDER — OXYTOCIN 10 UNIT/ML
333.33 VIAL (ML) INJECTION
Qty: 20 | Refills: 0 | Status: DISCONTINUED | OUTPATIENT
Start: 2021-02-06 | End: 2021-02-06

## 2021-02-06 RX ORDER — HYDROCORTISONE 1 %
1 OINTMENT (GRAM) TOPICAL EVERY 6 HOURS
Refills: 0 | Status: DISCONTINUED | OUTPATIENT
Start: 2021-02-06 | End: 2021-02-07

## 2021-02-06 RX ADMIN — SODIUM CHLORIDE 3 MILLILITER(S): 9 INJECTION INTRAMUSCULAR; INTRAVENOUS; SUBCUTANEOUS at 22:57

## 2021-02-06 RX ADMIN — Medication 1000 MILLIUNIT(S)/MIN: at 09:11

## 2021-02-06 RX ADMIN — Medication 0.2 MILLIGRAM(S): at 08:47

## 2021-02-06 RX ADMIN — Medication 1 TABLET(S): at 18:06

## 2021-02-06 RX ADMIN — SODIUM CHLORIDE 2000 MILLILITER(S): 9 INJECTION, SOLUTION INTRAVENOUS at 09:11

## 2021-02-06 RX ADMIN — Medication 975 MILLIGRAM(S): at 09:11

## 2021-02-06 RX ADMIN — Medication 30 MILLIGRAM(S): at 10:10

## 2021-02-06 RX ADMIN — Medication 975 MILLIGRAM(S): at 18:06

## 2021-02-06 RX ADMIN — ERTAPENEM SODIUM 120 MILLIGRAM(S): 1 INJECTION, POWDER, LYOPHILIZED, FOR SOLUTION INTRAMUSCULAR; INTRAVENOUS at 11:17

## 2021-02-06 NOTE — OB PROVIDER LABOR PROGRESS NOTE - NS_SUBJECTIVE/OBJECTIVE_OBGYN_ALL_OB_FT
PGY1 Labor & Delivery Progress Note     Pt seen & examined at bedside 2/2 inc pelvic pressure. Cervical balloon removed without incidence.    T(C): 37.0 (02-05-21 @ 16:02), Max: 37.0 (02-04-21 @ 23:30)  HR: 90 (02-05-21 @ 19:52) (69 - 119)  BP: 109/56 (02-05-21 @ 19:52) (78/39 - 139/75)  RR: 16 (02-04-21 @ 23:52) (16 - 16)  SpO2: 94% (02-05-21 @ 19:52) (87% - 100%)
R1 Progress Note     Patient examined, feeling more rectal pressure.
R1 Progress Note     Patient examined, feeling more rectal pressure.
R1 Progress Note     Patient examined for placement of cervical balloon. CB placed 60 cc NS in uterine, 60 in vaginal. Patient tolerated procedure well.
Pt examined at bedside for rectal pressure w cxts.  No other complaints.

## 2021-02-06 NOTE — OB PROVIDER DELIVERY SUMMARY - NSPROVIDERDELIVERYNOTE_OBGYN_ALL_OB_FT
uncomplicated  of liveborn female from vtx/op over RML episiotomy.  head, shoulders and body delivered easily, spontaneous cry, cord clamp delayed 60sec, infant passed to awaiting pediatricians.  RML episiotomy repaired in usual fashion with 2-0 and 3-0 chromic suture.  excellent hemostasis.  straight catheteriation of bladder yeilded 200cc concentrated uterine.  placenta delivered spontaneously, intact, 3vc.  pitocin started, mild atony noted, methergine IM given and bimanual exam performed and uterus became firm.  immediate postpartum fever 38.0 rectally.  invanz x1 to be given.  ebl 450.  count correct x2.  L Jake BUTCHER PGY4

## 2021-02-06 NOTE — CHART NOTE - NSCHARTNOTEFT_GEN_A_CORE
Service attending    pt pushing  ve FD/+1 ROP  fht 150 moderate variability accels variables with pushing  toco q 2min  a/p cat 2 fht with pushing  baby ROP  peanut ball 20-30 minutes then will push    Cora BUTCHER

## 2021-02-06 NOTE — OB NEONATOLOGY/PEDIATRICIAN DELIVERY SUMMARY - NSPEDSNEONOTESA_OBGYN_ALL_OB_FT
Baby boy/girl born at 41.2 wks via  to a 31 y/o  B+ blood type mother.  No significant maternal or prenatal history. PNL nr/immune/-, GBS - on . ROM at 00:30 on  with clear fluids. Peds present for possible shoulder. Baby emerged vigorous, crying, was w/d/s/s with APGARS of 8/9. Mom would like to breast and bottle feed, requests Hep B. EOS 0.23 Baby boy/girl born at 41.2 wks via  to a 30 y/o  B+ blood type mother.  No significant maternal or prenatal history. PNL nr/immune/-, GBS - on . ROM at 00:30 on  with clear fluids. Peds present for possible shoulder. Baby emerged vigorous, crying, was w/d/s/s with APGARS of 8/9. Mom would like to breast and bottle feed, requests Hep B. EOS 0.23 VDORIS Huggins-BC present at delivery

## 2021-02-06 NOTE — OB RN DELIVERY SUMMARY - NS_LABORCHARACTER_OBGYN_ALL_OB
Induction of labor-AROM/Induction of labor-Medicinal/Augmentation of labor/Febrile (>38C)/External electronic FM

## 2021-02-06 NOTE — OB PROVIDER LABOR PROGRESS NOTE - NS_OBIHIFHRDETAILS_OBGYN_ALL_OB_FT
135, mod betty, +accels, -decels
130, mod betty, +accels, -decels
160bpm, mod betty, +accels, decel w pushing, recovered back to baseline
130, mod betty, +accels, -decels
EFM: 130/mod. variability/+accles/-decels

## 2021-02-06 NOTE — OB PROVIDER LABOR PROGRESS NOTE - ASSESSMENT
A/P: 29yo  at 41w2d IOl for LT  - labor: c/w pitocin, anticipate   - fetus: decel x1, otherwise cat 1, ISE placed for discontinous tracing  - pain: epidural in situ  - GBS: [place] PGY4
Plan   pt positioned on peanut ball  cont pitocin  EFM Cat 1  Continue EFM/[place]  Anticipate      Marcelle Alford MD PGY1  d/w Dr. Tinsley
Plan: 31 y/o  @41.1 in stable condition  - Con't IOL w/ pitocin  - Continuous EFM, [place]  - Con't IVF    D/W attending physician Dr. Martha Harris MD  PGY-1
Plan   patient to be positioned on peanut ball  cont pitocin   anesthesia for top off  EFM Cat 1  Continue EFM/[place]  Anticipate      Marcelle Alford MD PGY1  d/w Dr. Thomas
Plan   cervical balloon in placed  continue PO cytotec  EFM Cat 1  Continue EFM/[place]  Anticipate      Marcelle Alford MD PGY1  d/w Dr. Valadez

## 2021-02-06 NOTE — OB RN DELIVERY SUMMARY - NS_SEPSISRSKCALC_OBGYN_ALL_OB_FT
EOS calculated successfully. EOS Risk Factor: 0.5/1000 live births ([facility] national incidence); GA=41w2d; Temp=100.4; ROM=8; GBS='Negative'; Antibiotics='No antibiotics or any antibiotics < 2 hrs prior to birth'

## 2021-02-07 ENCOUNTER — TRANSCRIPTION ENCOUNTER (OUTPATIENT)
Age: 31
End: 2021-02-07

## 2021-02-07 VITALS
DIASTOLIC BLOOD PRESSURE: 60 MMHG | RESPIRATION RATE: 16 BRPM | TEMPERATURE: 98 F | OXYGEN SATURATION: 100 % | HEART RATE: 95 BPM | SYSTOLIC BLOOD PRESSURE: 105 MMHG

## 2021-02-07 RX ORDER — IBUPROFEN 200 MG
1 TABLET ORAL
Qty: 0 | Refills: 0 | DISCHARGE
Start: 2021-02-07

## 2021-02-07 RX ORDER — IBUPROFEN 200 MG
600 TABLET ORAL EVERY 6 HOURS
Refills: 0 | Status: DISCONTINUED | OUTPATIENT
Start: 2021-02-07 | End: 2021-02-07

## 2021-02-07 RX ORDER — ACETAMINOPHEN 500 MG
3 TABLET ORAL
Qty: 0 | Refills: 0 | DISCHARGE
Start: 2021-02-07

## 2021-02-07 RX ADMIN — Medication 600 MILLIGRAM(S): at 11:37

## 2021-02-07 RX ADMIN — Medication 600 MILLIGRAM(S): at 06:35

## 2021-02-07 RX ADMIN — SODIUM CHLORIDE 3 MILLILITER(S): 9 INJECTION INTRAMUSCULAR; INTRAVENOUS; SUBCUTANEOUS at 14:50

## 2021-02-07 RX ADMIN — Medication 600 MILLIGRAM(S): at 01:26

## 2021-02-07 RX ADMIN — Medication 600 MILLIGRAM(S): at 17:30

## 2021-02-07 RX ADMIN — Medication 1 TABLET(S): at 11:37

## 2021-02-07 NOTE — DISCHARGE NOTE OB - PROVIDER TOKENS
FREE:[LAST:[[facility]],PHONE:[phone]],FAX:[(   [phone]    -],ADDRESS:[[facility], Oncology Basement  [phone] [place] [address]]]

## 2021-02-07 NOTE — LACTATION INITIAL EVALUATION - INTERVENTION OUTCOME
discussed benefits of breastfeeding, supply and demand, feeding cues, wet and soiled diaper log, safe sleep practices;  encouraged to ask for assistance as needed/verbalizes understanding/demonstrates understanding of teaching

## 2021-02-07 NOTE — DISCHARGE NOTE OB - ADDITIONAL INSTRUCTIONS
Make your follow-up appointment with your doctor as ordered. No heavy lifting, driving, or strenuous activity for 6 weeks. Nothing per vagina such as tampons, intercourse, douches or tub baths for 6 weeks or until you see your doctor. Call your doctor with any signs and symptoms of infection such as fever, chills, nausea or vomiting. Call your doctor with redness or swelling at the incision site, fluid leakage or wound separation. Call your doctor if you’re unable to tolerate food, increase in vaginal bleeding, or have difficulty urinating. Call your doctor if you have pain that is not relieved by your prescribed medications. Notify your doctor with any other concerns. Call [phone] if you have any of these concerns in the next 6 weeks.    Please schedule appointments to see us in the Ob/Gyn clinic.   Please schedule one appointment TWO WEEKS from discharge for a post operative incision check.   Please schedule another appointment SIX WEEKS from discharge for a routine post partum visit. Make your follow-up appointment with your doctor as ordered.  Make an appt in 6 weeks for a routine postpartum visit.     No heavy lifting, driving, or strenuous activity for 6 weeks. Nothing per vagina such as tampons, intercourse, douches, or tub baths for 6 weeks or until you see your doctor. Call your doctor with any signs and symptoms of infection such as fever, chills, nausea, or vomiting. Call your doctor if you're unable to tolerate food, increase in vaginal bleeding, or have difficulty urinating. Call your doctor if you have pain that is not relieved by your prescribed medications. Notify your doctor with any other concerns.     Call [phone] if you have any of these concerns in the next 6 weeks.

## 2021-02-07 NOTE — PROGRESS NOTE ADULT - ASSESSMENT
A/P: 29yo PPD#1 s/p  c/b endometritis and urinary retention, both now resolved.  Patient is stable and doing well post-partum.

## 2021-02-07 NOTE — DISCHARGE NOTE OB - CARE PLAN
Principal Discharge DX:	 delivery delivered  Goal:	full recovery  Assessment and plan of treatment:	Make your follow-up appointment with your doctor as ordered. No heavy lifting, driving, or strenuous activity for 6 weeks. Nothing per vagina such as tampons, intercourse, douches or tub baths for 6 weeks or until you see your doctor. Call your doctor with any signs and symptoms of infection such as fever, chills, nausea or vomiting. Call your doctor with redness or swelling at the incision site, fluid leakage or wound separation. Call your doctor if you’re unable to tolerate food, increase in vaginal bleeding, or have difficulty urinating. Call your doctor if you have pain that is not relieved by your prescribed medications. Notify your doctor with any other concerns. Call [phone] if you have any of these concerns in the next 6 weeks.    Please schedule appointments to see us in the Ob/Gyn clinic.   Please schedule one appointment TWO WEEKS from discharge for a post operative incision check.   Please schedule another appointment SIX WEEKS from discharge for a routine post partum visit.   Principal Discharge DX:	 (normal spontaneous vaginal delivery)  Goal:	full recovery  Assessment and plan of treatment:	Make your follow-up appointment with your doctor as ordered.  Make an appt in 6 weeks for a routine postpartum visit.     No heavy lifting, driving, or strenuous activity for 6 weeks. Nothing per vagina such as tampons, intercourse, douches, or tub baths for 6 weeks or until you see your doctor. Call your doctor with any signs and symptoms of infection such as fever, chills, nausea, or vomiting. Call your doctor if you're unable to tolerate food, increase in vaginal bleeding, or have difficulty urinating. Call your doctor if you have pain that is not relieved by your prescribed medications. Notify your doctor with any other concerns.     Call [phone] if you have any of these concerns in the next 6 weeks.

## 2021-02-07 NOTE — DISCHARGE NOTE OB - PLAN OF CARE
full recovery Make your follow-up appointment with your doctor as ordered. No heavy lifting, driving, or strenuous activity for 6 weeks. Nothing per vagina such as tampons, intercourse, douches or tub baths for 6 weeks or until you see your doctor. Call your doctor with any signs and symptoms of infection such as fever, chills, nausea or vomiting. Call your doctor with redness or swelling at the incision site, fluid leakage or wound separation. Call your doctor if you’re unable to tolerate food, increase in vaginal bleeding, or have difficulty urinating. Call your doctor if you have pain that is not relieved by your prescribed medications. Notify your doctor with any other concerns. Call [phone] if you have any of these concerns in the next 6 weeks.    Please schedule appointments to see us in the Ob/Gyn clinic.   Please schedule one appointment TWO WEEKS from discharge for a post operative incision check.   Please schedule another appointment SIX WEEKS from discharge for a routine post partum visit. Make your follow-up appointment with your doctor as ordered.  Make an appt in 6 weeks for a routine postpartum visit.     No heavy lifting, driving, or strenuous activity for 6 weeks. Nothing per vagina such as tampons, intercourse, douches, or tub baths for 6 weeks or until you see your doctor. Call your doctor with any signs and symptoms of infection such as fever, chills, nausea, or vomiting. Call your doctor if you're unable to tolerate food, increase in vaginal bleeding, or have difficulty urinating. Call your doctor if you have pain that is not relieved by your prescribed medications. Notify your doctor with any other concerns.     Call [phone] if you have any of these concerns in the next 6 weeks.

## 2021-02-07 NOTE — DISCHARGE NOTE OB - CARE PROVIDER_API CALL
[facility],   [facility], Oncology Basement  [phone] [place] [address]  Phone: [phone]  Fax: (   )    -  Follow Up Time:

## 2021-02-07 NOTE — PROGRESS NOTE ADULT - SUBJECTIVE AND OBJECTIVE BOX
S:  Pt seen and examined for cervical change   c/o vaginal pressure      O:   T(C): 36.9 (22:30)  HR: 95 (00:22)  BP: 116/61 (00:16)  RR: --  SpO2: 99% (00:22)  SVE: 7/100/0          A/P: 30y admitted for induction of labor for late term   -Continue current management  -Anticipate   -    L MD Martha
OB Progress Note:  PPD#1    S: 31yo PPD#1 s/p  c/b endometritis on @9am s/p Invanz and postpartum urinary retention s/p straight cath x2 overnight. Today, patient feels well. She has been afebrile for the past 24 hours. She is now voiding spontaneously. Pain is well controlled. She is tolerating a regular diet and passing flatus. She is ambulating without difficulty. Denies fevers/chills/CP/SOB. Denies lightheadedness/dizziness. Denies N/V.    O:  Vitals:  Vital Signs Last 24 Hrs  T(C): 36.7 (2021 05:50), Max: 38 (2021 10:00)  T(F): 98.1 (2021 05:50), Max: 100.4 (2021 10:00)  HR: 99 (2021 05:50) (88 - 124)  BP: 109/56 (2021 05:50) (103/55 - 117/59)  BP(mean): --  RR: 18 (2021 05:50) (18 - 18)  SpO2: 100% (2021 05:50) (90% - 100%)    MEDICATIONS  (STANDING):  acetaminophen   Tablet .. 975 milliGRAM(s) Oral <User Schedule>  diphtheria/tetanus/pertussis (acellular) Vaccine (ADAcel) 0.5 milliLiter(s) IntraMuscular once  ibuprofen  Tablet. 600 milliGRAM(s) Oral every 6 hours  influenza   Vaccine 0.5 milliLiter(s) IntraMuscular once  prenatal multivitamin 1 Tablet(s) Oral daily  sodium chloride 0.9% lock flush 3 milliLiter(s) IV Push every 8 hours      Labs:  Blood type: B Positive  Rubella IgG: RPR: Negative                          12.1   10.28 >-----------< 217    (  @ 02:41 )             39.3    Physical Exam:  General: NAD  Abdomen: soft, non-tender, non-distended, fundus firm  Vaginal: Lochia wnl  Extremities: No erythema/edema

## 2021-02-07 NOTE — PROGRESS NOTE ADULT - ATTENDING COMMENTS
Pt seen and examined.  Pt feels well denies HA, CP, SOB, calf pain, fever/ chills.  Tolerating reg diet -n/-v.  Pt now voiding without difficulty.     VSS, afebrile x >24 hrs  voiding 600cc/2hrs    General: NAD  Abd: fundus firm, nontender  Ext: no calf tenderness b/l    A/P PPD #1 sp VD complicated by endometritis s/p antibiotics.  Urinary retention earlier now resolved.     If pt remains stable and baby cleared for discharge pt considering dc home today.  If pt desires to stay today will dc home tomorrow if no acute events.    Zoila Valadez MD

## 2021-02-07 NOTE — DISCHARGE NOTE OB - HOSPITAL COURSE
Patient was admitted to L+D for , Pt had an uncomplicated  followed by postpartum course c/b endometritis (Tmax 38.0 on PPD0) s/p Invanz. On PPD1, patient remained afebrile and did not have any symptoms of endometritis.  EBL: 450  Hct: 39.3  On Postpartum day 1, patient was discharged home in stable condition, afebrile, voiding spontaneously, pain well controlled, ambulating, tolerating PO and with normal vital signs. Patient's postpartum birth control plan is condoms. Patient plans to follow up in the [facility] in 6 weeks. Telephone number and clinic information provided prior to discharge.

## 2021-02-07 NOTE — PROGRESS NOTE ADULT - PROBLEM SELECTOR PLAN 1
- Afebrile x24 hours s/p 1 dose of Invanz  - VS stable  - Pain well controlled, continue current pain regimen  - Increase ambulation  - Continue regular diet  - Contraception: condoms  - Discharge planning    Tere Howell MD PGY1

## 2021-02-07 NOTE — DISCHARGE NOTE OB - PATIENT PORTAL LINK FT
You can access the FollowMyHealth Patient Portal offered by [facility] by registering at the following website: [email or web address]. By joining [facility]’s FollowMyHealth portal, you will also be able to view your health information using other applications (apps) compatible with our system.

## 2021-02-07 NOTE — LACTATION INITIAL EVALUATION - LACTATION INTERVENTIONS
assisted to put baby to left breast in football hold position;  baby was unable to achieve latch;  was able to latch with nipple shield and sucked a few times but no colostrum was noted in chamber of nipple shield;  pt. was given electric breast pump and encouraged to continue to try to get baby to latch, followed by pumping colostrum and feeding 15 ml. of colostrum and/or formula 8 to 12x/day./initiate skin to skin/initiate hand expression routine/initiate dual electric pump routine/initiate/review early breastfeeding management guidelines/initiate/review techniques for position and latch/post discharge community resources provided/initiate/review supplementation plan due to medical indications/initiate/review nipple shield use/review techniques to increase milk supply/Initiate/review alternate feeding method

## 2021-02-18 DIAGNOSIS — O48.0 POST-TERM PREGNANCY: ICD-10-CM

## 2021-02-18 DIAGNOSIS — Z3A.40 40 WEEKS GESTATION OF PREGNANCY: ICD-10-CM

## 2021-03-23 ENCOUNTER — APPOINTMENT (OUTPATIENT)
Dept: OBGYN | Facility: HOSPITAL | Age: 31
End: 2021-03-23

## 2021-04-08 ENCOUNTER — NON-APPOINTMENT (OUTPATIENT)
Age: 31
End: 2021-04-08

## 2021-04-08 ENCOUNTER — APPOINTMENT (OUTPATIENT)
Dept: OBGYN | Facility: HOSPITAL | Age: 31
End: 2021-04-08

## 2021-04-08 ENCOUNTER — OUTPATIENT (OUTPATIENT)
Dept: OUTPATIENT SERVICES | Facility: HOSPITAL | Age: 31
LOS: 1 days | End: 2021-04-08

## 2021-04-08 VITALS
WEIGHT: 164 LBS | TEMPERATURE: 98.1 F | HEIGHT: 59 IN | BODY MASS INDEX: 33.06 KG/M2 | HEART RATE: 79 BPM | DIASTOLIC BLOOD PRESSURE: 66 MMHG | SYSTOLIC BLOOD PRESSURE: 114 MMHG

## 2021-04-08 DIAGNOSIS — O26.893 OTHER SPECIFIED PREGNANCY RELATED CONDITIONS, THIRD TRIMESTER: ICD-10-CM

## 2021-04-08 DIAGNOSIS — Z34.03 ENCOUNTER FOR SUPERVISION OF NORMAL FIRST PREGNANCY, THIRD TRIMESTER: ICD-10-CM

## 2021-04-08 DIAGNOSIS — R12 OTHER SPECIFIED PREGNANCY RELATED CONDITIONS, THIRD TRIMESTER: ICD-10-CM

## 2021-04-08 DIAGNOSIS — Z23 ENCOUNTER FOR IMMUNIZATION: ICD-10-CM

## 2021-04-08 DIAGNOSIS — Z90.49 ACQUIRED ABSENCE OF OTHER SPECIFIED PARTS OF DIGESTIVE TRACT: Chronic | ICD-10-CM

## 2021-04-08 DIAGNOSIS — Z34.00 ENCOUNTER FOR SUPERVISION OF NORMAL FIRST PREGNANCY, UNSPECIFIED TRIMESTER: ICD-10-CM

## 2021-04-08 DIAGNOSIS — Z01.818 ENCOUNTER FOR OTHER PREPROCEDURAL EXAMINATION: ICD-10-CM

## 2021-04-08 DIAGNOSIS — O48.0 POST-TERM PREGNANCY: ICD-10-CM

## 2021-04-08 NOTE — HISTORY OF PRESENT ILLNESS
[Postpartum Follow Up] : postpartum follow up [Complications:___] : no complications [Last Pap Date: ___] : Last Pap Date: [unfilled] [Delivery Date: ___] : on [unfilled] [] : delivered by vaginal delivery [Female] : Delivery History: baby girl [Wt. ___] : weighing [unfilled] [Rhogam] : Rhogam was not administered [Rubella Vaccine] : Rubella vaccine was not administered [Pertussis Vaccine] : Pertussis vaccine administered [BTL] : no tubal ligation [Breastfeeding] : currently nursing [Discharge HCT: ___] : hematocrit level was [unfilled] [Discharge HGB: ___] : hemoglobin level was [unfilled] [Resumed Menses] : has not resumed her menses [Resumed [place]] : has not resumed intercourse [Intended Contraception] : Intended Contraception: [Condoms] : condoms [Back to Normal] : is back to normal in size [Normal] : the vagina was normal [Healing Well] : is healing well [Doing Well] : is doing well [None] : None [de-identified] : Feeling well.  Baby has routine pediatrician appt. following this visit.  Patient is pumping due to flat nipples.  Requesting more PNV's. [de-identified] : PNV's dispensed.  Advised consistent use of condoms.  RTC for annual exam Summer 2021.

## 2023-04-26 ENCOUNTER — RESULT REVIEW (OUTPATIENT)
Age: 33
End: 2023-04-26

## 2023-04-26 ENCOUNTER — NON-APPOINTMENT (OUTPATIENT)
Age: 33
End: 2023-04-26

## 2023-04-26 ENCOUNTER — OUTPATIENT (OUTPATIENT)
Dept: OUTPATIENT SERVICES | Facility: HOSPITAL | Age: 33
LOS: 1 days | End: 2023-04-26
Payer: MEDICAID

## 2023-04-26 ENCOUNTER — APPOINTMENT (OUTPATIENT)
Dept: OBGYN | Facility: HOSPITAL | Age: 33
End: 2023-04-26

## 2023-04-26 VITALS
HEART RATE: 85 BPM | BODY MASS INDEX: 31.04 KG/M2 | SYSTOLIC BLOOD PRESSURE: 125 MMHG | TEMPERATURE: 97.7 F | DIASTOLIC BLOOD PRESSURE: 69 MMHG | HEIGHT: 59 IN | WEIGHT: 154 LBS

## 2023-04-26 DIAGNOSIS — Z90.49 ACQUIRED ABSENCE OF OTHER SPECIFIED PARTS OF DIGESTIVE TRACT: Chronic | ICD-10-CM

## 2023-04-26 LAB
24R-OH-CALCIDIOL SERPL-MCNC: 29.1 NG/ML — LOW (ref 30–80)
ALBUMIN SERPL ELPH-MCNC: 3.8 G/DL — SIGNIFICANT CHANGE UP (ref 3.3–5)
ALP SERPL-CCNC: 55 U/L — SIGNIFICANT CHANGE UP (ref 40–120)
ALT FLD-CCNC: 21 U/L — SIGNIFICANT CHANGE UP (ref 4–33)
ANION GAP SERPL CALC-SCNC: 14 MMOL/L — SIGNIFICANT CHANGE UP (ref 7–14)
APPEARANCE UR: CLEAR — SIGNIFICANT CHANGE UP
AST SERPL-CCNC: 20 U/L — SIGNIFICANT CHANGE UP (ref 4–32)
BASOPHILS # BLD AUTO: 0.03 K/UL — SIGNIFICANT CHANGE UP (ref 0–0.2)
BASOPHILS NFR BLD AUTO: 0.3 % — SIGNIFICANT CHANGE UP (ref 0–2)
BILIRUB SERPL-MCNC: 0.3 MG/DL — SIGNIFICANT CHANGE UP (ref 0.2–1.2)
BILIRUB UR-MCNC: NEGATIVE — SIGNIFICANT CHANGE UP
BUN SERPL-MCNC: 7 MG/DL — SIGNIFICANT CHANGE UP (ref 7–23)
CALCIUM SERPL-MCNC: 9 MG/DL — SIGNIFICANT CHANGE UP (ref 8.4–10.5)
CHLORIDE SERPL-SCNC: 101 MMOL/L — SIGNIFICANT CHANGE UP (ref 98–107)
CO2 SERPL-SCNC: 20 MMOL/L — LOW (ref 22–31)
COLOR SPEC: SIGNIFICANT CHANGE UP
COVID-19 SPIKE DOMAIN AB INTERP: POSITIVE
COVID-19 SPIKE DOMAIN ANTIBODY RESULT: >250 U/ML — HIGH
CREAT SERPL-MCNC: 0.45 MG/DL — LOW (ref 0.5–1.3)
DIFF PNL FLD: NEGATIVE — SIGNIFICANT CHANGE UP
EGFR: 132 ML/MIN/1.73M2 — SIGNIFICANT CHANGE UP
EOSINOPHIL # BLD AUTO: 0.23 K/UL — SIGNIFICANT CHANGE UP (ref 0–0.5)
EOSINOPHIL NFR BLD AUTO: 2.1 % — SIGNIFICANT CHANGE UP (ref 0–6)
EPI CELLS # UR: 1 /HPF — SIGNIFICANT CHANGE UP (ref 0–5)
GLUCOSE 1H P MEAL SERPL-MCNC: 144 MG/DL — HIGH (ref 70–134)
GLUCOSE SERPL-MCNC: 136 MG/DL — HIGH (ref 70–99)
GLUCOSE UR QL: NEGATIVE — SIGNIFICANT CHANGE UP
HCT VFR BLD CALC: 36.1 % — SIGNIFICANT CHANGE UP (ref 34.5–45)
HGB BLD-MCNC: 12 G/DL — SIGNIFICANT CHANGE UP (ref 11.5–15.5)
HIV 1+2 AB+HIV1 P24 AG SERPL QL IA: SIGNIFICANT CHANGE UP
IANC: 6.77 K/UL — SIGNIFICANT CHANGE UP (ref 1.8–7.4)
IMM GRANULOCYTES NFR BLD AUTO: 0.2 % — SIGNIFICANT CHANGE UP (ref 0–0.9)
KETONES UR-MCNC: NEGATIVE — SIGNIFICANT CHANGE UP
LEUKOCYTE ESTERASE UR-ACNC: NEGATIVE — SIGNIFICANT CHANGE UP
LYMPHOCYTES # BLD AUTO: 3.37 K/UL — HIGH (ref 1–3.3)
LYMPHOCYTES # BLD AUTO: 31 % — SIGNIFICANT CHANGE UP (ref 13–44)
MCHC RBC-ENTMCNC: 28.4 PG — SIGNIFICANT CHANGE UP (ref 27–34)
MCHC RBC-ENTMCNC: 33.2 GM/DL — SIGNIFICANT CHANGE UP (ref 32–36)
MCV RBC AUTO: 85.5 FL — SIGNIFICANT CHANGE UP (ref 80–100)
MEV IGG SER-ACNC: >300 AU/ML — SIGNIFICANT CHANGE UP
MEV IGG+IGM SER-IMP: POSITIVE — SIGNIFICANT CHANGE UP
MONOCYTES # BLD AUTO: 0.45 K/UL — SIGNIFICANT CHANGE UP (ref 0–0.9)
MONOCYTES NFR BLD AUTO: 4.1 % — SIGNIFICANT CHANGE UP (ref 2–14)
NEUTROPHILS # BLD AUTO: 6.77 K/UL — SIGNIFICANT CHANGE UP (ref 1.8–7.4)
NEUTROPHILS NFR BLD AUTO: 62.3 % — SIGNIFICANT CHANGE UP (ref 43–77)
NITRITE UR-MCNC: NEGATIVE — SIGNIFICANT CHANGE UP
NRBC # BLD: 0 /100 WBCS — SIGNIFICANT CHANGE UP (ref 0–0)
NRBC # FLD: 0 K/UL — SIGNIFICANT CHANGE UP (ref 0–0)
PH UR: 6.5 — SIGNIFICANT CHANGE UP (ref 5–8)
PLATELET # BLD AUTO: 257 K/UL — SIGNIFICANT CHANGE UP (ref 150–400)
POTASSIUM SERPL-MCNC: 2.8 MMOL/L — CRITICAL LOW (ref 3.5–5.3)
POTASSIUM SERPL-SCNC: 2.8 MMOL/L — CRITICAL LOW (ref 3.5–5.3)
PROT SERPL-MCNC: 7 G/DL — SIGNIFICANT CHANGE UP (ref 6–8.3)
PROT UR-MCNC: NEGATIVE — SIGNIFICANT CHANGE UP
RBC # BLD: 4.22 M/UL — SIGNIFICANT CHANGE UP (ref 3.8–5.2)
RBC # FLD: 12.7 % — SIGNIFICANT CHANGE UP (ref 10.3–14.5)
RBC CASTS # UR COMP ASSIST: 0 /HPF — SIGNIFICANT CHANGE UP (ref 0–4)
RUBV IGG SER-ACNC: 14.2 INDEX — SIGNIFICANT CHANGE UP
RUBV IGG SER-IMP: POSITIVE — SIGNIFICANT CHANGE UP
SARS-COV-2 IGG+IGM SERPL QL IA: >250 U/ML — HIGH
SARS-COV-2 IGG+IGM SERPL QL IA: POSITIVE
SODIUM SERPL-SCNC: 135 MMOL/L — SIGNIFICANT CHANGE UP (ref 135–145)
SP GR SPEC: 1.01 — LOW (ref 1.01–1.05)
T PALLIDUM AB TITR SER: NEGATIVE — SIGNIFICANT CHANGE UP
URATE SERPL-MCNC: 3 MG/DL — SIGNIFICANT CHANGE UP (ref 2.5–7)
UROBILINOGEN FLD QL: SIGNIFICANT CHANGE UP
VZV IGG FLD QL IA: 1004 INDEX — SIGNIFICANT CHANGE UP
VZV IGG FLD QL IA: POSITIVE — SIGNIFICANT CHANGE UP
WBC # BLD: 10.87 K/UL — HIGH (ref 3.8–10.5)
WBC # FLD AUTO: 10.87 K/UL — HIGH (ref 3.8–10.5)
WBC UR QL: 0 /HPF — SIGNIFICANT CHANGE UP (ref 0–5)

## 2023-04-26 PROCEDURE — G0452: CPT | Mod: 26

## 2023-04-26 RX ORDER — PNV 119/IRON FUM/FOLIC ACID 29 MG-1 MG
TABLET ORAL DAILY
Qty: 30 | Refills: 6 | Status: ACTIVE | COMMUNITY
Start: 2023-04-26

## 2023-04-26 RX ORDER — POTASSIUM CHLORIDE 1500 MG/1
20 TABLET, EXTENDED RELEASE ORAL TWICE DAILY
Qty: 6 | Refills: 0 | Status: ACTIVE | COMMUNITY
Start: 2023-04-26 | End: 1900-01-01

## 2023-04-27 ENCOUNTER — APPOINTMENT (OUTPATIENT)
Dept: OBGYN | Facility: HOSPITAL | Age: 33
End: 2023-04-27

## 2023-04-27 DIAGNOSIS — Z34.90 ENCOUNTER FOR SUPERVISION OF NORMAL PREGNANCY, UNSPECIFIED, UNSPECIFIED TRIMESTER: ICD-10-CM

## 2023-04-27 DIAGNOSIS — R63.8 OTHER SYMPTOMS AND SIGNS CONCERNING FOOD AND FLUID INTAKE: ICD-10-CM

## 2023-04-27 LAB
C TRACH RRNA SPEC QL NAA+PROBE: SIGNIFICANT CHANGE UP
C TRACH+GC RRNA SPEC QL PROBE: SIGNIFICANT CHANGE UP
CULTURE RESULTS: SIGNIFICANT CHANGE UP
HBV SURFACE AG SER-ACNC: SIGNIFICANT CHANGE UP
HCV AB S/CO SERPL IA: 0.1 S/CO — SIGNIFICANT CHANGE UP (ref 0–0.99)
HCV AB SERPL-IMP: SIGNIFICANT CHANGE UP
HPV HIGH+LOW RISK DNA PNL CVX: SIGNIFICANT CHANGE UP
LEAD BLD-MCNC: <1 UG/DL — SIGNIFICANT CHANGE UP (ref 0–3.4)
N GONORRHOEA RRNA SPEC QL NAA+PROBE: SIGNIFICANT CHANGE UP
SPECIMEN SOURCE: SIGNIFICANT CHANGE UP

## 2023-04-28 ENCOUNTER — APPOINTMENT (OUTPATIENT)
Dept: OBGYN | Facility: HOSPITAL | Age: 33
End: 2023-04-28

## 2023-04-28 LAB
GAMMA INTERFERON BACKGROUND BLD IA-ACNC: 0.02 IU/ML — SIGNIFICANT CHANGE UP
M TB IFN-G BLD-IMP: NEGATIVE — SIGNIFICANT CHANGE UP
M TB IFN-G CD4+ BCKGRND COR BLD-ACNC: 0.03 IU/ML — SIGNIFICANT CHANGE UP
M TB IFN-G CD4+CD8+ BCKGRND COR BLD-ACNC: 0.02 IU/ML — SIGNIFICANT CHANGE UP
QUANT TB PLUS MITOGEN MINUS NIL: 9.11 IU/ML — SIGNIFICANT CHANGE UP

## 2023-05-01 ENCOUNTER — NON-APPOINTMENT (OUTPATIENT)
Age: 33
End: 2023-05-01

## 2023-05-01 LAB — CYTOLOGY SPEC DOC CYTO: SIGNIFICANT CHANGE UP

## 2023-05-02 LAB — SMA INTERPRETATION: SIGNIFICANT CHANGE UP

## 2023-05-04 ENCOUNTER — NON-APPOINTMENT (OUTPATIENT)
Age: 33
End: 2023-05-04

## 2023-05-05 ENCOUNTER — APPOINTMENT (OUTPATIENT)
Dept: OBGYN | Facility: HOSPITAL | Age: 33
End: 2023-05-05
Payer: MEDICAID

## 2023-05-05 ENCOUNTER — APPOINTMENT (OUTPATIENT)
Dept: MATERNAL FETAL MEDICINE | Facility: HOSPITAL | Age: 33
End: 2023-05-05
Payer: MEDICAID

## 2023-05-05 ENCOUNTER — OUTPATIENT (OUTPATIENT)
Dept: OUTPATIENT SERVICES | Facility: HOSPITAL | Age: 33
LOS: 1 days | End: 2023-05-05

## 2023-05-05 ENCOUNTER — ASOB RESULT (OUTPATIENT)
Age: 33
End: 2023-05-05

## 2023-05-05 DIAGNOSIS — Z90.49 ACQUIRED ABSENCE OF OTHER SPECIFIED PARTS OF DIGESTIVE TRACT: Chronic | ICD-10-CM

## 2023-05-05 PROCEDURE — 76813 OB US NUCHAL MEAS 1 GEST: CPT | Mod: 26

## 2023-05-05 PROCEDURE — 76801 OB US < 14 WKS SINGLE FETUS: CPT | Mod: 26

## 2023-05-05 RX ORDER — ISOPROPYL ALCOHOL 70 ML/100ML
SWAB TOPICAL
Qty: 1 | Refills: 3 | Status: ACTIVE | COMMUNITY
Start: 2023-05-05 | End: 1900-01-01

## 2023-05-05 RX ORDER — BLOOD-GLUCOSE METER
W/DEVICE KIT MISCELLANEOUS
Qty: 1 | Refills: 0 | Status: ACTIVE | COMMUNITY
Start: 2023-05-05 | End: 1900-01-01

## 2023-05-05 RX ORDER — LANCETS
EACH MISCELLANEOUS
Qty: 1 | Refills: 4 | Status: ACTIVE | COMMUNITY
Start: 2023-05-05 | End: 1900-01-01

## 2023-05-05 RX ORDER — BLOOD SUGAR DIAGNOSTIC
STRIP MISCELLANEOUS 4 TIMES DAILY
Qty: 1 | Refills: 4 | Status: ACTIVE | COMMUNITY
Start: 2023-05-05 | End: 1900-01-01

## 2023-05-08 ENCOUNTER — NON-APPOINTMENT (OUTPATIENT)
Age: 33
End: 2023-05-08

## 2023-05-08 DIAGNOSIS — Z34.90 ENCOUNTER FOR SUPERVISION OF NORMAL PREGNANCY, UNSPECIFIED, UNSPECIFIED TRIMESTER: ICD-10-CM

## 2023-05-30 ENCOUNTER — NON-APPOINTMENT (OUTPATIENT)
Age: 33
End: 2023-05-30

## 2023-05-31 ENCOUNTER — NON-APPOINTMENT (OUTPATIENT)
Age: 33
End: 2023-05-31

## 2023-06-07 ENCOUNTER — NON-APPOINTMENT (OUTPATIENT)
Age: 33
End: 2023-06-07

## 2023-06-07 ENCOUNTER — APPOINTMENT (OUTPATIENT)
Dept: OBGYN | Facility: HOSPITAL | Age: 33
End: 2023-06-07

## 2023-06-12 ENCOUNTER — NON-APPOINTMENT (OUTPATIENT)
Age: 33
End: 2023-06-12

## 2023-06-13 ENCOUNTER — NON-APPOINTMENT (OUTPATIENT)
Age: 33
End: 2023-06-13

## 2023-06-14 ENCOUNTER — RESULT REVIEW (OUTPATIENT)
Age: 33
End: 2023-06-14

## 2023-06-14 ENCOUNTER — OUTPATIENT (OUTPATIENT)
Dept: OUTPATIENT SERVICES | Facility: HOSPITAL | Age: 33
LOS: 1 days | End: 2023-06-14

## 2023-06-14 ENCOUNTER — APPOINTMENT (OUTPATIENT)
Dept: OBGYN | Facility: HOSPITAL | Age: 33
End: 2023-06-14

## 2023-06-14 VITALS
SYSTOLIC BLOOD PRESSURE: 107 MMHG | DIASTOLIC BLOOD PRESSURE: 60 MMHG | WEIGHT: 159 LBS | TEMPERATURE: 97.7 F | BODY MASS INDEX: 32.11 KG/M2

## 2023-06-14 DIAGNOSIS — E87.6 HYPOKALEMIA: ICD-10-CM

## 2023-06-14 DIAGNOSIS — Z90.49 ACQUIRED ABSENCE OF OTHER SPECIFIED PARTS OF DIGESTIVE TRACT: Chronic | ICD-10-CM

## 2023-06-14 DIAGNOSIS — R73.09 OTHER ABNORMAL GLUCOSE: ICD-10-CM

## 2023-06-14 DIAGNOSIS — Z34.90 ENCOUNTER FOR SUPERVISION OF NORMAL PREGNANCY, UNSPECIFIED, UNSPECIFIED TRIMESTER: ICD-10-CM

## 2023-06-14 DIAGNOSIS — R63.8 OTHER SYMPTOMS AND SIGNS CONCERNING FOOD AND FLUID INTAKE: ICD-10-CM

## 2023-06-20 LAB
AFP ADJ MOM SERPL: 1.08 — SIGNIFICANT CHANGE UP
AFP INTERP SERPL-IMP: SIGNIFICANT CHANGE UP
AFP INTERP SERPL-IMP: SIGNIFICANT CHANGE UP
AFP SERPL-MCNC: 42.5 NG/ML — SIGNIFICANT CHANGE UP
AGE AT DELIVERY: 32.4 YR — SIGNIFICANT CHANGE UP
ALPHA FETOPROTEIN SERUM COMMENT: SIGNIFICANT CHANGE UP
ALPHA FETOPROTEIN SERUM RESULTS: SIGNIFICANT CHANGE UP
GA METHOD: SIGNIFICANT CHANGE UP
GA: 17.3 WEEKS — SIGNIFICANT CHANGE UP
IDDM PATIENT QL: NO — SIGNIFICANT CHANGE UP
MULTIPLE PREGNANCY: NO — SIGNIFICANT CHANGE UP
NEURAL TUBE DEFECT RISK FETUS: 9540 — SIGNIFICANT CHANGE UP
RACE AFP: SIGNIFICANT CHANGE UP

## 2023-07-05 ENCOUNTER — NON-APPOINTMENT (OUTPATIENT)
Age: 33
End: 2023-07-05

## 2023-07-07 ENCOUNTER — ASOB RESULT (OUTPATIENT)
Age: 33
End: 2023-07-07

## 2023-07-07 ENCOUNTER — APPOINTMENT (OUTPATIENT)
Dept: MATERNAL FETAL MEDICINE | Facility: HOSPITAL | Age: 33
End: 2023-07-07
Payer: MEDICAID

## 2023-07-07 ENCOUNTER — APPOINTMENT (OUTPATIENT)
Dept: OBGYN | Facility: HOSPITAL | Age: 33
End: 2023-07-07
Payer: MEDICAID

## 2023-07-07 ENCOUNTER — OUTPATIENT (OUTPATIENT)
Dept: OUTPATIENT SERVICES | Facility: HOSPITAL | Age: 33
LOS: 1 days | End: 2023-07-07

## 2023-07-07 VITALS
HEART RATE: 88 BPM | DIASTOLIC BLOOD PRESSURE: 73 MMHG | TEMPERATURE: 97.2 F | HEIGHT: 59 IN | WEIGHT: 162 LBS | SYSTOLIC BLOOD PRESSURE: 118 MMHG | BODY MASS INDEX: 32.66 KG/M2

## 2023-07-07 DIAGNOSIS — Z90.49 ACQUIRED ABSENCE OF OTHER SPECIFIED PARTS OF DIGESTIVE TRACT: Chronic | ICD-10-CM

## 2023-07-07 DIAGNOSIS — Z33.1 PREGNANT STATE, INCIDENTAL: ICD-10-CM

## 2023-07-07 PROCEDURE — 76805 OB US >/= 14 WKS SNGL FETUS: CPT | Mod: 26

## 2023-08-22 ENCOUNTER — NON-APPOINTMENT (OUTPATIENT)
Age: 33
End: 2023-08-22

## 2023-08-23 ENCOUNTER — RESULT REVIEW (OUTPATIENT)
Age: 33
End: 2023-08-23

## 2023-08-23 ENCOUNTER — OUTPATIENT (OUTPATIENT)
Dept: OUTPATIENT SERVICES | Facility: HOSPITAL | Age: 33
LOS: 1 days | End: 2023-08-23

## 2023-08-23 ENCOUNTER — APPOINTMENT (OUTPATIENT)
Dept: OBGYN | Facility: HOSPITAL | Age: 33
End: 2023-08-23

## 2023-08-23 VITALS
HEIGHT: 59 IN | HEART RATE: 97 BPM | BODY MASS INDEX: 35.08 KG/M2 | DIASTOLIC BLOOD PRESSURE: 63 MMHG | WEIGHT: 174 LBS | SYSTOLIC BLOOD PRESSURE: 111 MMHG

## 2023-08-23 DIAGNOSIS — E66.9 OBESITY, UNSPECIFIED: ICD-10-CM

## 2023-08-23 DIAGNOSIS — R10.2 PELVIC AND PERINEAL PAIN: ICD-10-CM

## 2023-08-23 DIAGNOSIS — R73.09 OTHER ABNORMAL GLUCOSE: ICD-10-CM

## 2023-08-23 DIAGNOSIS — Z34.90 ENCOUNTER FOR SUPERVISION OF NORMAL PREGNANCY, UNSPECIFIED, UNSPECIFIED TRIMESTER: ICD-10-CM

## 2023-08-23 DIAGNOSIS — Z00.00 ENCOUNTER FOR GENERAL ADULT MEDICAL EXAMINATION W/OUT ABNORMAL FINDINGS: ICD-10-CM

## 2023-08-23 DIAGNOSIS — Z90.49 ACQUIRED ABSENCE OF OTHER SPECIFIED PARTS OF DIGESTIVE TRACT: Chronic | ICD-10-CM

## 2023-08-23 LAB
24R-OH-CALCIDIOL SERPL-MCNC: 28 NG/ML — LOW (ref 30–80)
APPEARANCE UR: CLEAR — SIGNIFICANT CHANGE UP
BASOPHILS # BLD AUTO: 0.03 K/UL — SIGNIFICANT CHANGE UP (ref 0–0.2)
BASOPHILS NFR BLD AUTO: 0.3 % — SIGNIFICANT CHANGE UP (ref 0–2)
BILIRUB UR-MCNC: NEGATIVE — SIGNIFICANT CHANGE UP
COLOR SPEC: YELLOW — SIGNIFICANT CHANGE UP
DIFF PNL FLD: NEGATIVE — SIGNIFICANT CHANGE UP
EOSINOPHIL # BLD AUTO: 0.15 K/UL — SIGNIFICANT CHANGE UP (ref 0–0.5)
EOSINOPHIL NFR BLD AUTO: 1.4 % — SIGNIFICANT CHANGE UP (ref 0–6)
GLUCOSE UR QL: NEGATIVE MG/DL — SIGNIFICANT CHANGE UP
HCT VFR BLD CALC: 34.7 % — SIGNIFICANT CHANGE UP (ref 34.5–45)
HGB BLD-MCNC: 11.5 G/DL — SIGNIFICANT CHANGE UP (ref 11.5–15.5)
IANC: 7.28 K/UL — SIGNIFICANT CHANGE UP (ref 1.8–7.4)
IMM GRANULOCYTES NFR BLD AUTO: 0.8 % — SIGNIFICANT CHANGE UP (ref 0–0.9)
KETONES UR-MCNC: NEGATIVE MG/DL — SIGNIFICANT CHANGE UP
LEUKOCYTE ESTERASE UR-ACNC: NEGATIVE — SIGNIFICANT CHANGE UP
LYMPHOCYTES # BLD AUTO: 2.63 K/UL — SIGNIFICANT CHANGE UP (ref 1–3.3)
LYMPHOCYTES # BLD AUTO: 24.3 % — SIGNIFICANT CHANGE UP (ref 13–44)
MCHC RBC-ENTMCNC: 29 PG — SIGNIFICANT CHANGE UP (ref 27–34)
MCHC RBC-ENTMCNC: 33.1 GM/DL — SIGNIFICANT CHANGE UP (ref 32–36)
MCV RBC AUTO: 87.4 FL — SIGNIFICANT CHANGE UP (ref 80–100)
MONOCYTES # BLD AUTO: 0.65 K/UL — SIGNIFICANT CHANGE UP (ref 0–0.9)
MONOCYTES NFR BLD AUTO: 6 % — SIGNIFICANT CHANGE UP (ref 2–14)
NEUTROPHILS # BLD AUTO: 7.28 K/UL — SIGNIFICANT CHANGE UP (ref 1.8–7.4)
NEUTROPHILS NFR BLD AUTO: 67.2 % — SIGNIFICANT CHANGE UP (ref 43–77)
NITRITE UR-MCNC: NEGATIVE — SIGNIFICANT CHANGE UP
NRBC # BLD: 0 /100 WBCS — SIGNIFICANT CHANGE UP (ref 0–0)
NRBC # FLD: 0 K/UL — SIGNIFICANT CHANGE UP (ref 0–0)
PH UR: 7 — SIGNIFICANT CHANGE UP (ref 5–8)
PLATELET # BLD AUTO: 227 K/UL — SIGNIFICANT CHANGE UP (ref 150–400)
PROT UR-MCNC: NEGATIVE MG/DL — SIGNIFICANT CHANGE UP
RBC # BLD: 3.97 M/UL — SIGNIFICANT CHANGE UP (ref 3.8–5.2)
RBC # FLD: 13.5 % — SIGNIFICANT CHANGE UP (ref 10.3–14.5)
SP GR SPEC: 1.01 — SIGNIFICANT CHANGE UP (ref 1–1.03)
UROBILINOGEN FLD QL: 1 MG/DL — SIGNIFICANT CHANGE UP (ref 0.2–1)
WBC # BLD: 10.83 K/UL — HIGH (ref 3.8–10.5)
WBC # FLD AUTO: 10.83 K/UL — HIGH (ref 3.8–10.5)

## 2023-08-24 LAB
CANDIDA AB TITR SER: SIGNIFICANT CHANGE UP
G VAGINALIS DNA SPEC QL NAA+PROBE: SIGNIFICANT CHANGE UP
T PALLIDUM AB TITR SER: NEGATIVE — SIGNIFICANT CHANGE UP
T VAGINALIS SPEC QL WET PREP: SIGNIFICANT CHANGE UP

## 2023-08-25 LAB
CULTURE RESULTS: SIGNIFICANT CHANGE UP
SPECIMEN SOURCE: SIGNIFICANT CHANGE UP

## 2023-09-11 ENCOUNTER — OUTPATIENT (OUTPATIENT)
Dept: INPATIENT UNIT | Facility: HOSPITAL | Age: 33
LOS: 1 days | Discharge: HOME | End: 2023-09-11

## 2023-09-11 DIAGNOSIS — O26.899 OTHER SPECIFIED PREGNANCY RELATED CONDITIONS, UNSPECIFIED TRIMESTER: ICD-10-CM

## 2023-09-11 DIAGNOSIS — Z90.49 ACQUIRED ABSENCE OF OTHER SPECIFIED PARTS OF DIGESTIVE TRACT: Chronic | ICD-10-CM

## 2023-09-12 ENCOUNTER — OUTPATIENT (OUTPATIENT)
Dept: OUTPATIENT SERVICES | Facility: HOSPITAL | Age: 33
LOS: 1 days | Discharge: HOME | End: 2023-09-12
Payer: MEDICAID

## 2023-09-12 VITALS — SYSTOLIC BLOOD PRESSURE: 107 MMHG | DIASTOLIC BLOOD PRESSURE: 58 MMHG | HEART RATE: 95 BPM

## 2023-09-12 VITALS
DIASTOLIC BLOOD PRESSURE: 65 MMHG | TEMPERATURE: 98 F | SYSTOLIC BLOOD PRESSURE: 107 MMHG | HEART RATE: 99 BPM | RESPIRATION RATE: 16 BRPM

## 2023-09-12 VITALS — HEART RATE: 95 BPM | DIASTOLIC BLOOD PRESSURE: 59 MMHG | SYSTOLIC BLOOD PRESSURE: 106 MMHG

## 2023-09-12 DIAGNOSIS — Z90.49 ACQUIRED ABSENCE OF OTHER SPECIFIED PARTS OF DIGESTIVE TRACT: Chronic | ICD-10-CM

## 2023-09-12 DIAGNOSIS — O26.899 OTHER SPECIFIED PREGNANCY RELATED CONDITIONS, UNSPECIFIED TRIMESTER: ICD-10-CM

## 2023-09-12 LAB
APPEARANCE UR: ABNORMAL
BACTERIA # UR AUTO: ABNORMAL /HPF
BILIRUB UR-MCNC: NEGATIVE — SIGNIFICANT CHANGE UP
CAST: 0 /LPF — SIGNIFICANT CHANGE UP (ref 0–4)
COLOR SPEC: YELLOW — SIGNIFICANT CHANGE UP
DIFF PNL FLD: NEGATIVE — SIGNIFICANT CHANGE UP
GLUCOSE UR QL: NEGATIVE MG/DL — SIGNIFICANT CHANGE UP
KETONES UR-MCNC: NEGATIVE MG/DL — SIGNIFICANT CHANGE UP
LEUKOCYTE ESTERASE UR-ACNC: ABNORMAL
NITRITE UR-MCNC: NEGATIVE — SIGNIFICANT CHANGE UP
PH UR: 7 — SIGNIFICANT CHANGE UP (ref 5–8)
PROT UR-MCNC: NEGATIVE MG/DL — SIGNIFICANT CHANGE UP
RBC CASTS # UR COMP ASSIST: 1 /HPF — SIGNIFICANT CHANGE UP (ref 0–4)
REVIEW: SIGNIFICANT CHANGE UP
SP GR SPEC: 1.01 — SIGNIFICANT CHANGE UP (ref 1–1.03)
SQUAMOUS # UR AUTO: 6 /HPF — HIGH (ref 0–5)
UROBILINOGEN FLD QL: 0.2 MG/DL — SIGNIFICANT CHANGE UP (ref 0.2–1)
WBC UR QL: 7 /HPF — HIGH (ref 0–5)

## 2023-09-12 PROCEDURE — 59025 FETAL NON-STRESS TEST: CPT | Mod: 26

## 2023-09-12 PROCEDURE — 99221 1ST HOSP IP/OBS SF/LOW 40: CPT | Mod: 25

## 2023-09-12 NOTE — OB PROVIDER TRIAGE NOTE - NSHPPHYSICALEXAM_GEN_ALL_CORE
ICU Vital Signs Last 24 Hrs  T(C): 36.6 (12 Sep 2023 05:53), Max: 36.8 (12 Sep 2023 02:03)  T(F): 97.9 (12 Sep 2023 05:53), Max: 98.2 (12 Sep 2023 02:03)  HR: 95 (12 Sep 2023 05:53) (95 - 99)  BP: 106/59 (12 Sep 2023 05:53) (106/59 - 107/65)  BP(mean): --  ABP: --  ABP(mean): --  RR: 18 (12 Sep 2023 05:53) (16 - 18)  SpO2: --    Gen: A&O x 3; NAD    Neuro- symmetrical facial features, no slurring of words  Pulm- breathing is unlabored  Abd exam- soft and nontender; CVA tenderness  Extremities- full range of motion    TVS- Images saved to sono- ICU Vital Signs Last 24 Hrs  T(C): 36.6 (12 Sep 2023 05:53), Max: 36.8 (12 Sep 2023 02:03)  T(F): 97.9 (12 Sep 2023 05:53), Max: 98.2 (12 Sep 2023 02:03)  HR: 95 (12 Sep 2023 05:53) (95 - 99)  BP: 106/59 (12 Sep 2023 05:53) (106/59 - 107/65)  BP(mean): --  ABP: --  ABP(mean): --  RR: 18 (12 Sep 2023 05:53) (16 - 18)  SpO2: --    Gen: A&O x 3; NAD    Neuro- symmetrical facial features, no slurring of words  Pulm- breathing is unlabored  Abd exam- soft and with + tenderness in RLQ, no rebound or guarding; CVA tenderness  Extremities- full range of motion    VE-closed/ long/-3  TVS- Images saved to sono-3.15 cm with no funnel or dynamic changes  Abd sono- Images saved to sono- vtx; anterior placenta; JHON-16.77; 8/8 BPP. + fetal shoulder in RLQ and fetus with excessive fetal movement    NST reactive with 120 baseline with accels and mod variability; no ctx's

## 2023-09-12 NOTE — OB PROVIDER TRIAGE NOTE - NSOBPROVIDERNOTE_OBGYN_ALL_OB_FT
34yo female  @ 30.1 wks SLIUP uncomp PNC here complaining of RLQ pain radiating to the back and now down right front thigh  -UA with moderate bacteria; UCx sent  -TVS long   - 32yo female  @ 30.1 wks SLIUP uncomp PNC here complaining of RLQ pain radiating to the back and now down right front thigh  -UA with moderate bacteria; UCx sent  -TVS long   -TAS 8/8 BPP with fetal parts in RLQ   -pt with hx appendectomy 17yrs ago  -pt was dw Dr Brewer  -pt cleared for discharge home  with increased hydration and fetal binder. To keep her scheduled PCAP appt on Friday 9/15/2023  -pt was discharged home at 7:02a

## 2023-09-12 NOTE — OB PROVIDER TRIAGE NOTE - NSHPLABSRESULTS_GEN_ALL_CORE
Urinalysis Basic - ( 12 Sep 2023 05:47 )    Color: Yellow / Appearance: Cloudy / S.007 / pH: x  Gluc: x / Ketone: Negative mg/dL  / Bili: Negative / Urobili: 0.2 mg/dL   Blood: x / Protein: Negative mg/dL / Nitrite: Negative   Leuk Esterase: Trace / RBC: 1 /HPF / WBC 7 /HPF   Sq Epi: x / Non Sq Epi: 6 /HPF / Bacteria: Moderate /HPF

## 2023-09-12 NOTE — OB PROVIDER TRIAGE NOTE - HISTORY OF PRESENT ILLNESS
32yo female  @ 30.1 wks SLIUP here complaining od RLQ pain since 10am 2023. Pt reports the pain is intermittent, occurring every 30-45 min and lasting each time 10-15 min. Pt originally radiated to the R lower back and so pt came in for evaluation at 2am. While waiting to be seen, pain resolved so she eloped and went home. Pt reports the pain came back and is now radiating down the front of her right thigh. Pt took Tylenol 500mg with no relief and presents back for evaluation. Pt reports GFM and denies VB/ LOF/ changes in vag discharge. Pt reports normal bowel habits with last being few hours ago. No fever or chills, no nausea or vomiting. Pt reports no changes in appetite. Pt denies hematuria/ dysuria/ urgency or frequency. Pt is a previous full-term  with IOL at 41 wks, no hx PTL. Pt with signif PShx of appendectomy 17-18 yrs ago.      AP course signif for elevated GCT, refused to do GTT. Completed 1 week of fingersticks which was normal per pt.

## 2023-09-12 NOTE — OB RN TRIAGE NOTE - FALL HARM RISK - UNIVERSAL INTERVENTIONS
Bed in lowest position, wheels locked, appropriate side rails in place/Call bell, personal items and telephone in reach/Instruct patient to call for assistance before getting out of bed or chair/Non-slip footwear when patient is out of bed/[place] to call system/Physically safe environment - no spills, clutter or unnecessary equipment/Purposeful Proactive Rounding/Room/bathroom lighting operational, light cord in reach

## 2023-09-12 NOTE — OB RN TRIAGE NOTE - TEMPERATURE IN CELSIUS (DEGREES C)
How Severe Are Your Spot(S)?: mild
What Type Of Note Output Would You Prefer (Optional)?: Standard Output
36.8
What Is The Reason For Today's Visit?: Full Body Skin Examination
What Is The Reason For Today's Visit? (Being Monitored For X): the development of a new lesion

## 2023-09-13 ENCOUNTER — NON-APPOINTMENT (OUTPATIENT)
Age: 33
End: 2023-09-13

## 2023-09-13 DIAGNOSIS — Z53.21 PROCEDURE AND TREATMENT NOT CARRIED OUT DUE TO PATIENT LEAVING PRIOR TO BEING SEEN BY HEALTH CARE PROVIDER: ICD-10-CM

## 2023-09-14 DIAGNOSIS — R10.31 RIGHT LOWER QUADRANT PAIN: ICD-10-CM

## 2023-09-14 DIAGNOSIS — O26.893 OTHER SPECIFIED PREGNANCY RELATED CONDITIONS, THIRD TRIMESTER: ICD-10-CM

## 2023-09-14 DIAGNOSIS — Z3A.30 30 WEEKS GESTATION OF PREGNANCY: ICD-10-CM

## 2023-09-14 LAB
CULTURE RESULTS: SIGNIFICANT CHANGE UP
SPECIMEN SOURCE: SIGNIFICANT CHANGE UP

## 2023-09-15 ENCOUNTER — APPOINTMENT (OUTPATIENT)
Dept: MATERNAL FETAL MEDICINE | Facility: HOSPITAL | Age: 33
End: 2023-09-15
Payer: MEDICAID

## 2023-09-15 ENCOUNTER — APPOINTMENT (OUTPATIENT)
Dept: OBGYN | Facility: HOSPITAL | Age: 33
End: 2023-09-15
Payer: MEDICAID

## 2023-09-15 ENCOUNTER — ASOB RESULT (OUTPATIENT)
Age: 33
End: 2023-09-15

## 2023-09-15 ENCOUNTER — NON-APPOINTMENT (OUTPATIENT)
Age: 33
End: 2023-09-15

## 2023-09-15 ENCOUNTER — MED ADMIN CHARGE (OUTPATIENT)
Age: 33
End: 2023-09-15

## 2023-09-15 ENCOUNTER — OUTPATIENT (OUTPATIENT)
Dept: OUTPATIENT SERVICES | Facility: HOSPITAL | Age: 33
LOS: 1 days | End: 2023-09-15

## 2023-09-15 VITALS
DIASTOLIC BLOOD PRESSURE: 60 MMHG | RESPIRATION RATE: 20 BRPM | BODY MASS INDEX: 35.68 KG/M2 | HEART RATE: 99 BPM | SYSTOLIC BLOOD PRESSURE: 104 MMHG | HEIGHT: 59 IN | TEMPERATURE: 98.6 F | WEIGHT: 177 LBS

## 2023-09-15 DIAGNOSIS — Z23 ENCOUNTER FOR IMMUNIZATION: ICD-10-CM

## 2023-09-15 PROBLEM — Z78.9 OTHER SPECIFIED HEALTH STATUS: Chronic | Status: ACTIVE | Noted: 2023-09-12

## 2023-09-15 PROCEDURE — 76819 FETAL BIOPHYS PROFIL W/O NST: CPT | Mod: 26,59

## 2023-09-15 PROCEDURE — 76816 OB US FOLLOW-UP PER FETUS: CPT | Mod: 26

## 2023-09-19 DIAGNOSIS — Z34.90 ENCOUNTER FOR SUPERVISION OF NORMAL PREGNANCY, UNSPECIFIED, UNSPECIFIED TRIMESTER: ICD-10-CM

## 2023-09-25 ENCOUNTER — NON-APPOINTMENT (OUTPATIENT)
Age: 33
End: 2023-09-25

## 2023-09-26 ENCOUNTER — RESULT REVIEW (OUTPATIENT)
Age: 33
End: 2023-09-26

## 2023-09-26 ENCOUNTER — OUTPATIENT (OUTPATIENT)
Dept: OUTPATIENT SERVICES | Facility: HOSPITAL | Age: 33
LOS: 1 days | End: 2023-09-26

## 2023-09-26 ENCOUNTER — APPOINTMENT (OUTPATIENT)
Dept: OBGYN | Facility: HOSPITAL | Age: 33
End: 2023-09-26

## 2023-09-26 DIAGNOSIS — Z90.49 ACQUIRED ABSENCE OF OTHER SPECIFIED PARTS OF DIGESTIVE TRACT: Chronic | ICD-10-CM

## 2023-09-26 DIAGNOSIS — R10.2 PELVIC AND PERINEAL PAIN: ICD-10-CM

## 2023-09-26 DIAGNOSIS — Z86.39 PERSONAL HISTORY OF OTHER ENDOCRINE, NUTRITIONAL AND METABOLIC DISEASE: ICD-10-CM

## 2023-09-26 LAB
GESTATIONAL GTT PNL UR+SERPL: 74 MG/DL — SIGNIFICANT CHANGE UP (ref 70–94)
GLUCOSE 1H P CHAL SERPL-MCNC: 139 MG/DL — SIGNIFICANT CHANGE UP (ref 70–179)
GTT GEST 2H PNL UR+SERPL: 125 MG/DL — SIGNIFICANT CHANGE UP (ref 70–154)
GTT GEST 3H PNL SERPL: 113 MG/DL — SIGNIFICANT CHANGE UP (ref 70–139)

## 2023-09-27 DIAGNOSIS — R63.8 OTHER SYMPTOMS AND SIGNS CONCERNING FOOD AND FLUID INTAKE: ICD-10-CM

## 2023-09-27 DIAGNOSIS — Z34.90 ENCOUNTER FOR SUPERVISION OF NORMAL PREGNANCY, UNSPECIFIED, UNSPECIFIED TRIMESTER: ICD-10-CM

## 2023-09-27 DIAGNOSIS — R73.09 OTHER ABNORMAL GLUCOSE: ICD-10-CM

## 2023-10-11 ENCOUNTER — NON-APPOINTMENT (OUTPATIENT)
Age: 33
End: 2023-10-11

## 2023-10-13 ENCOUNTER — APPOINTMENT (OUTPATIENT)
Dept: OBGYN | Facility: HOSPITAL | Age: 33
End: 2023-10-13

## 2023-10-13 ENCOUNTER — APPOINTMENT (OUTPATIENT)
Dept: MATERNAL FETAL MEDICINE | Facility: HOSPITAL | Age: 33
End: 2023-10-13

## 2023-10-13 DIAGNOSIS — E66.9 OBESITY, UNSPECIFIED: ICD-10-CM

## 2023-10-26 ENCOUNTER — APPOINTMENT (OUTPATIENT)
Dept: OBGYN | Facility: HOSPITAL | Age: 33
End: 2023-10-26

## 2023-10-29 NOTE — OB PROVIDER H&P - NS_EFFACEMANT_OBGYN_ALL_OB_NU
0 You can access the FollowMyHealth Patient Portal offered by [facility] by registering at the following website: [email or web address]. By joining [employer]’s FollowMyHealth portal, you will also be able to view your health information using other applications (apps) compatible with our system.

## 2023-10-30 ENCOUNTER — APPOINTMENT (OUTPATIENT)
Dept: MATERNAL FETAL MEDICINE | Facility: HOSPITAL | Age: 33
End: 2023-10-30
Payer: MEDICAID

## 2023-10-30 ENCOUNTER — RESULT REVIEW (OUTPATIENT)
Age: 33
End: 2023-10-30

## 2023-10-30 ENCOUNTER — ASOB RESULT (OUTPATIENT)
Age: 33
End: 2023-10-30

## 2023-10-30 ENCOUNTER — APPOINTMENT (OUTPATIENT)
Dept: OBGYN | Facility: HOSPITAL | Age: 33
End: 2023-10-30
Payer: MEDICAID

## 2023-10-30 ENCOUNTER — OUTPATIENT (OUTPATIENT)
Dept: OUTPATIENT SERVICES | Facility: HOSPITAL | Age: 33
LOS: 1 days | End: 2023-10-30

## 2023-10-30 VITALS
HEART RATE: 91 BPM | HEIGHT: 59 IN | BODY MASS INDEX: 36.89 KG/M2 | DIASTOLIC BLOOD PRESSURE: 64 MMHG | WEIGHT: 183 LBS | SYSTOLIC BLOOD PRESSURE: 118 MMHG | TEMPERATURE: 97.8 F

## 2023-10-30 DIAGNOSIS — Z90.49 ACQUIRED ABSENCE OF OTHER SPECIFIED PARTS OF DIGESTIVE TRACT: Chronic | ICD-10-CM

## 2023-10-30 DIAGNOSIS — K59.01 SLOW TRANSIT CONSTIPATION: ICD-10-CM

## 2023-10-30 PROCEDURE — 76816 OB US FOLLOW-UP PER FETUS: CPT | Mod: 26

## 2023-10-30 PROCEDURE — 76819 FETAL BIOPHYS PROFIL W/O NST: CPT | Mod: 26,59

## 2023-10-30 RX ORDER — WITCH HAZEL 500 MG/ML
SOLUTION RECTAL; TOPICAL
Qty: 100 | Refills: 1 | Status: ACTIVE | COMMUNITY
Start: 2023-10-30 | End: 1900-01-01

## 2023-10-30 RX ORDER — POLYETHYLENE GLYCOL 3350 17 G/17G
17 POWDER, FOR SOLUTION ORAL DAILY
Qty: 90 | Refills: 0 | Status: ACTIVE | COMMUNITY
Start: 2023-10-30 | End: 1900-01-01

## 2023-10-30 RX ORDER — PRENATAL VIT NO.130/IRON/FOLIC 27MG-0.8MG
28-0.8 TABLET ORAL
Qty: 30 | Refills: 11 | Status: ACTIVE | COMMUNITY
Start: 2023-10-30 | End: 1900-01-01

## 2023-10-31 DIAGNOSIS — K64.0 FIRST DEGREE HEMORRHOIDS: ICD-10-CM

## 2023-10-31 DIAGNOSIS — Z34.83 ENCOUNTER FOR SUPERVISION OF OTHER NORMAL PREGNANCY, THIRD TRIMESTER: ICD-10-CM

## 2023-10-31 LAB
C TRACH RRNA SPEC QL NAA+PROBE: SIGNIFICANT CHANGE UP
C TRACH RRNA SPEC QL NAA+PROBE: SIGNIFICANT CHANGE UP
N GONORRHOEA RRNA SPEC QL NAA+PROBE: SIGNIFICANT CHANGE UP
N GONORRHOEA RRNA SPEC QL NAA+PROBE: SIGNIFICANT CHANGE UP
SPECIMEN SOURCE: SIGNIFICANT CHANGE UP
SPECIMEN SOURCE: SIGNIFICANT CHANGE UP

## 2023-11-01 LAB
GROUP B BETA STREP DNA (PCR): SIGNIFICANT CHANGE UP
GROUP B BETA STREP DNA (PCR): SIGNIFICANT CHANGE UP
SOURCE GROUP B STREP: SIGNIFICANT CHANGE UP
SOURCE GROUP B STREP: SIGNIFICANT CHANGE UP

## 2023-11-02 ENCOUNTER — APPOINTMENT (OUTPATIENT)
Dept: ANTEPARTUM | Facility: CLINIC | Age: 33
End: 2023-11-02

## 2023-11-08 ENCOUNTER — NON-APPOINTMENT (OUTPATIENT)
Age: 33
End: 2023-11-08

## 2023-11-09 ENCOUNTER — APPOINTMENT (OUTPATIENT)
Dept: OBGYN | Facility: HOSPITAL | Age: 33
End: 2023-11-09

## 2023-11-09 ENCOUNTER — OUTPATIENT (OUTPATIENT)
Dept: OUTPATIENT SERVICES | Facility: HOSPITAL | Age: 33
LOS: 1 days | End: 2023-11-09

## 2023-11-09 ENCOUNTER — RESULT REVIEW (OUTPATIENT)
Age: 33
End: 2023-11-09

## 2023-11-09 VITALS
TEMPERATURE: 97.7 F | DIASTOLIC BLOOD PRESSURE: 61 MMHG | BODY MASS INDEX: 36.89 KG/M2 | WEIGHT: 183 LBS | SYSTOLIC BLOOD PRESSURE: 113 MMHG | HEART RATE: 102 BPM | HEIGHT: 59 IN

## 2023-11-09 DIAGNOSIS — Z90.49 ACQUIRED ABSENCE OF OTHER SPECIFIED PARTS OF DIGESTIVE TRACT: Chronic | ICD-10-CM

## 2023-11-09 DIAGNOSIS — Z23 ENCOUNTER FOR IMMUNIZATION: ICD-10-CM

## 2023-11-09 DIAGNOSIS — Z34.90 ENCOUNTER FOR SUPERVISION OF NORMAL PREGNANCY, UNSPECIFIED, UNSPECIFIED TRIMESTER: ICD-10-CM

## 2023-11-09 DIAGNOSIS — R63.8 OTHER SYMPTOMS AND SIGNS CONCERNING FOOD AND FLUID INTAKE: ICD-10-CM

## 2023-11-09 DIAGNOSIS — R73.09 OTHER ABNORMAL GLUCOSE: ICD-10-CM

## 2023-11-09 DIAGNOSIS — Z87.19 PERSONAL HISTORY OF OTHER DISEASES OF THE DIGESTIVE SYSTEM: ICD-10-CM

## 2023-11-09 LAB
BASOPHILS # BLD AUTO: 0.02 K/UL — SIGNIFICANT CHANGE UP (ref 0–0.2)
BASOPHILS # BLD AUTO: 0.02 K/UL — SIGNIFICANT CHANGE UP (ref 0–0.2)
BASOPHILS NFR BLD AUTO: 0.2 % — SIGNIFICANT CHANGE UP (ref 0–2)
BASOPHILS NFR BLD AUTO: 0.2 % — SIGNIFICANT CHANGE UP (ref 0–2)
EOSINOPHIL # BLD AUTO: 0.09 K/UL — SIGNIFICANT CHANGE UP (ref 0–0.5)
EOSINOPHIL # BLD AUTO: 0.09 K/UL — SIGNIFICANT CHANGE UP (ref 0–0.5)
EOSINOPHIL NFR BLD AUTO: 0.9 % — SIGNIFICANT CHANGE UP (ref 0–6)
EOSINOPHIL NFR BLD AUTO: 0.9 % — SIGNIFICANT CHANGE UP (ref 0–6)
HCT VFR BLD CALC: 36.4 % — SIGNIFICANT CHANGE UP (ref 34.5–45)
HCT VFR BLD CALC: 36.4 % — SIGNIFICANT CHANGE UP (ref 34.5–45)
HGB BLD-MCNC: 12.1 G/DL — SIGNIFICANT CHANGE UP (ref 11.5–15.5)
HGB BLD-MCNC: 12.1 G/DL — SIGNIFICANT CHANGE UP (ref 11.5–15.5)
IANC: 7.06 K/UL — SIGNIFICANT CHANGE UP (ref 1.8–7.4)
IANC: 7.06 K/UL — SIGNIFICANT CHANGE UP (ref 1.8–7.4)
IMM GRANULOCYTES NFR BLD AUTO: 0.8 % — SIGNIFICANT CHANGE UP (ref 0–0.9)
IMM GRANULOCYTES NFR BLD AUTO: 0.8 % — SIGNIFICANT CHANGE UP (ref 0–0.9)
LYMPHOCYTES # BLD AUTO: 2.42 K/UL — SIGNIFICANT CHANGE UP (ref 1–3.3)
LYMPHOCYTES # BLD AUTO: 2.42 K/UL — SIGNIFICANT CHANGE UP (ref 1–3.3)
LYMPHOCYTES # BLD AUTO: 23.7 % — SIGNIFICANT CHANGE UP (ref 13–44)
LYMPHOCYTES # BLD AUTO: 23.7 % — SIGNIFICANT CHANGE UP (ref 13–44)
MCHC RBC-ENTMCNC: 28.4 PG — SIGNIFICANT CHANGE UP (ref 27–34)
MCHC RBC-ENTMCNC: 28.4 PG — SIGNIFICANT CHANGE UP (ref 27–34)
MCHC RBC-ENTMCNC: 33.2 GM/DL — SIGNIFICANT CHANGE UP (ref 32–36)
MCHC RBC-ENTMCNC: 33.2 GM/DL — SIGNIFICANT CHANGE UP (ref 32–36)
MCV RBC AUTO: 85.4 FL — SIGNIFICANT CHANGE UP (ref 80–100)
MCV RBC AUTO: 85.4 FL — SIGNIFICANT CHANGE UP (ref 80–100)
MONOCYTES # BLD AUTO: 0.52 K/UL — SIGNIFICANT CHANGE UP (ref 0–0.9)
MONOCYTES # BLD AUTO: 0.52 K/UL — SIGNIFICANT CHANGE UP (ref 0–0.9)
MONOCYTES NFR BLD AUTO: 5.1 % — SIGNIFICANT CHANGE UP (ref 2–14)
MONOCYTES NFR BLD AUTO: 5.1 % — SIGNIFICANT CHANGE UP (ref 2–14)
NEUTROPHILS # BLD AUTO: 7.06 K/UL — SIGNIFICANT CHANGE UP (ref 1.8–7.4)
NEUTROPHILS # BLD AUTO: 7.06 K/UL — SIGNIFICANT CHANGE UP (ref 1.8–7.4)
NEUTROPHILS NFR BLD AUTO: 69.3 % — SIGNIFICANT CHANGE UP (ref 43–77)
NEUTROPHILS NFR BLD AUTO: 69.3 % — SIGNIFICANT CHANGE UP (ref 43–77)
NRBC # BLD: 0 /100 WBCS — SIGNIFICANT CHANGE UP (ref 0–0)
NRBC # BLD: 0 /100 WBCS — SIGNIFICANT CHANGE UP (ref 0–0)
NRBC # FLD: 0 K/UL — SIGNIFICANT CHANGE UP (ref 0–0)
NRBC # FLD: 0 K/UL — SIGNIFICANT CHANGE UP (ref 0–0)
PLATELET # BLD AUTO: 166 K/UL — SIGNIFICANT CHANGE UP (ref 150–400)
PLATELET # BLD AUTO: 166 K/UL — SIGNIFICANT CHANGE UP (ref 150–400)
RBC # BLD: 4.26 M/UL — SIGNIFICANT CHANGE UP (ref 3.8–5.2)
RBC # BLD: 4.26 M/UL — SIGNIFICANT CHANGE UP (ref 3.8–5.2)
RBC # FLD: 14.5 % — SIGNIFICANT CHANGE UP (ref 10.3–14.5)
RBC # FLD: 14.5 % — SIGNIFICANT CHANGE UP (ref 10.3–14.5)
WBC # BLD: 10.19 K/UL — SIGNIFICANT CHANGE UP (ref 3.8–10.5)
WBC # BLD: 10.19 K/UL — SIGNIFICANT CHANGE UP (ref 3.8–10.5)
WBC # FLD AUTO: 10.19 K/UL — SIGNIFICANT CHANGE UP (ref 3.8–10.5)
WBC # FLD AUTO: 10.19 K/UL — SIGNIFICANT CHANGE UP (ref 3.8–10.5)

## 2023-11-10 DIAGNOSIS — Z34.90 ENCOUNTER FOR SUPERVISION OF NORMAL PREGNANCY, UNSPECIFIED, UNSPECIFIED TRIMESTER: ICD-10-CM

## 2023-11-10 DIAGNOSIS — R63.8 OTHER SYMPTOMS AND SIGNS CONCERNING FOOD AND FLUID INTAKE: ICD-10-CM

## 2023-11-19 ENCOUNTER — INPATIENT (INPATIENT)
Facility: HOSPITAL | Age: 33
LOS: 1 days | Discharge: HOME | End: 2023-11-21
Attending: SPECIALIST | Admitting: SPECIALIST
Payer: MEDICAID

## 2023-11-19 VITALS
HEART RATE: 116 BPM | SYSTOLIC BLOOD PRESSURE: 118 MMHG | RESPIRATION RATE: 16 BRPM | TEMPERATURE: 98 F | DIASTOLIC BLOOD PRESSURE: 74 MMHG

## 2023-11-19 DIAGNOSIS — O26.899 OTHER SPECIFIED PREGNANCY RELATED CONDITIONS, UNSPECIFIED TRIMESTER: ICD-10-CM

## 2023-11-19 DIAGNOSIS — Z90.49 ACQUIRED ABSENCE OF OTHER SPECIFIED PARTS OF DIGESTIVE TRACT: Chronic | ICD-10-CM

## 2023-11-19 LAB
BASOPHILS # BLD AUTO: 0.02 K/UL — SIGNIFICANT CHANGE UP (ref 0–0.2)
BASOPHILS # BLD AUTO: 0.02 K/UL — SIGNIFICANT CHANGE UP (ref 0–0.2)
BASOPHILS NFR BLD AUTO: 0.2 % — SIGNIFICANT CHANGE UP (ref 0–2)
BASOPHILS NFR BLD AUTO: 0.2 % — SIGNIFICANT CHANGE UP (ref 0–2)
BLD GP AB SCN SERPL QL: NEGATIVE — SIGNIFICANT CHANGE UP
BLD GP AB SCN SERPL QL: NEGATIVE — SIGNIFICANT CHANGE UP
EOSINOPHIL # BLD AUTO: 0.03 K/UL — SIGNIFICANT CHANGE UP (ref 0–0.5)
EOSINOPHIL # BLD AUTO: 0.03 K/UL — SIGNIFICANT CHANGE UP (ref 0–0.5)
EOSINOPHIL NFR BLD AUTO: 0.2 % — SIGNIFICANT CHANGE UP (ref 0–6)
EOSINOPHIL NFR BLD AUTO: 0.2 % — SIGNIFICANT CHANGE UP (ref 0–6)
HCT VFR BLD CALC: 38.5 % — SIGNIFICANT CHANGE UP (ref 34.5–45)
HCT VFR BLD CALC: 38.5 % — SIGNIFICANT CHANGE UP (ref 34.5–45)
HGB BLD-MCNC: 12.7 G/DL — SIGNIFICANT CHANGE UP (ref 11.5–15.5)
HGB BLD-MCNC: 12.7 G/DL — SIGNIFICANT CHANGE UP (ref 11.5–15.5)
IANC: 8.46 K/UL — HIGH (ref 1.8–7.4)
IANC: 8.46 K/UL — HIGH (ref 1.8–7.4)
IMM GRANULOCYTES NFR BLD AUTO: 1 % — HIGH (ref 0–0.9)
IMM GRANULOCYTES NFR BLD AUTO: 1 % — HIGH (ref 0–0.9)
LYMPHOCYTES # BLD AUTO: 2.74 K/UL — SIGNIFICANT CHANGE UP (ref 1–3.3)
LYMPHOCYTES # BLD AUTO: 2.74 K/UL — SIGNIFICANT CHANGE UP (ref 1–3.3)
LYMPHOCYTES # BLD AUTO: 22.7 % — SIGNIFICANT CHANGE UP (ref 13–44)
LYMPHOCYTES # BLD AUTO: 22.7 % — SIGNIFICANT CHANGE UP (ref 13–44)
MCHC RBC-ENTMCNC: 28.5 PG — SIGNIFICANT CHANGE UP (ref 27–34)
MCHC RBC-ENTMCNC: 28.5 PG — SIGNIFICANT CHANGE UP (ref 27–34)
MCHC RBC-ENTMCNC: 33 GM/DL — SIGNIFICANT CHANGE UP (ref 32–36)
MCHC RBC-ENTMCNC: 33 GM/DL — SIGNIFICANT CHANGE UP (ref 32–36)
MCV RBC AUTO: 86.3 FL — SIGNIFICANT CHANGE UP (ref 80–100)
MCV RBC AUTO: 86.3 FL — SIGNIFICANT CHANGE UP (ref 80–100)
MONOCYTES # BLD AUTO: 0.68 K/UL — SIGNIFICANT CHANGE UP (ref 0–0.9)
MONOCYTES # BLD AUTO: 0.68 K/UL — SIGNIFICANT CHANGE UP (ref 0–0.9)
MONOCYTES NFR BLD AUTO: 5.6 % — SIGNIFICANT CHANGE UP (ref 2–14)
MONOCYTES NFR BLD AUTO: 5.6 % — SIGNIFICANT CHANGE UP (ref 2–14)
NEUTROPHILS # BLD AUTO: 8.46 K/UL — HIGH (ref 1.8–7.4)
NEUTROPHILS # BLD AUTO: 8.46 K/UL — HIGH (ref 1.8–7.4)
NEUTROPHILS NFR BLD AUTO: 70.3 % — SIGNIFICANT CHANGE UP (ref 43–77)
NEUTROPHILS NFR BLD AUTO: 70.3 % — SIGNIFICANT CHANGE UP (ref 43–77)
NRBC # BLD: 0 /100 WBCS — SIGNIFICANT CHANGE UP (ref 0–0)
NRBC # BLD: 0 /100 WBCS — SIGNIFICANT CHANGE UP (ref 0–0)
NRBC # FLD: 0 K/UL — SIGNIFICANT CHANGE UP (ref 0–0)
NRBC # FLD: 0 K/UL — SIGNIFICANT CHANGE UP (ref 0–0)
PLATELET # BLD AUTO: 189 K/UL — SIGNIFICANT CHANGE UP (ref 150–400)
PLATELET # BLD AUTO: 189 K/UL — SIGNIFICANT CHANGE UP (ref 150–400)
RBC # BLD: 4.46 M/UL — SIGNIFICANT CHANGE UP (ref 3.8–5.2)
RBC # BLD: 4.46 M/UL — SIGNIFICANT CHANGE UP (ref 3.8–5.2)
RBC # FLD: 14.5 % — SIGNIFICANT CHANGE UP (ref 10.3–14.5)
RBC # FLD: 14.5 % — SIGNIFICANT CHANGE UP (ref 10.3–14.5)
RH IG SCN BLD-IMP: POSITIVE — SIGNIFICANT CHANGE UP
RH IG SCN BLD-IMP: POSITIVE — SIGNIFICANT CHANGE UP
WBC # BLD: 12.05 K/UL — HIGH (ref 3.8–10.5)
WBC # BLD: 12.05 K/UL — HIGH (ref 3.8–10.5)
WBC # FLD AUTO: 12.05 K/UL — HIGH (ref 3.8–10.5)
WBC # FLD AUTO: 12.05 K/UL — HIGH (ref 3.8–10.5)

## 2023-11-19 PROCEDURE — 59409 OBSTETRICAL CARE: CPT | Mod: U9,GC

## 2023-11-19 RX ORDER — IBUPROFEN 200 MG
600 TABLET ORAL EVERY 6 HOURS
Refills: 0 | Status: DISCONTINUED | OUTPATIENT
Start: 2023-11-19 | End: 2023-11-21

## 2023-11-19 RX ORDER — SODIUM CHLORIDE 9 MG/ML
1000 INJECTION, SOLUTION INTRAVENOUS ONCE
Refills: 0 | Status: DISCONTINUED | OUTPATIENT
Start: 2023-11-19 | End: 2023-11-20

## 2023-11-19 RX ORDER — LANOLIN
1 OINTMENT (GRAM) TOPICAL EVERY 6 HOURS
Refills: 0 | Status: DISCONTINUED | OUTPATIENT
Start: 2023-11-19 | End: 2023-11-21

## 2023-11-19 RX ORDER — KETOROLAC TROMETHAMINE 30 MG/ML
30 SYRINGE (ML) INJECTION ONCE
Refills: 0 | Status: DISCONTINUED | OUTPATIENT
Start: 2023-11-19 | End: 2023-11-19

## 2023-11-19 RX ORDER — OXYCODONE HYDROCHLORIDE 5 MG/1
5 TABLET ORAL
Refills: 0 | Status: DISCONTINUED | OUTPATIENT
Start: 2023-11-19 | End: 2023-11-21

## 2023-11-19 RX ORDER — DIPHENHYDRAMINE HCL 50 MG
25 CAPSULE ORAL EVERY 6 HOURS
Refills: 0 | Status: DISCONTINUED | OUTPATIENT
Start: 2023-11-19 | End: 2023-11-21

## 2023-11-19 RX ORDER — CHLORHEXIDINE GLUCONATE 213 G/1000ML
1 SOLUTION TOPICAL DAILY
Refills: 0 | Status: DISCONTINUED | OUTPATIENT
Start: 2023-11-19 | End: 2023-11-20

## 2023-11-19 RX ORDER — SODIUM CHLORIDE 9 MG/ML
500 INJECTION, SOLUTION INTRAVENOUS ONCE
Refills: 0 | Status: DISCONTINUED | OUTPATIENT
Start: 2023-11-19 | End: 2023-11-20

## 2023-11-19 RX ORDER — SIMETHICONE 80 MG/1
80 TABLET, CHEWABLE ORAL EVERY 4 HOURS
Refills: 0 | Status: DISCONTINUED | OUTPATIENT
Start: 2023-11-19 | End: 2023-11-21

## 2023-11-19 RX ORDER — ACETAMINOPHEN 500 MG
975 TABLET ORAL
Refills: 0 | Status: DISCONTINUED | OUTPATIENT
Start: 2023-11-19 | End: 2023-11-21

## 2023-11-19 RX ORDER — AER TRAVELER 0.5 G/1
1 SOLUTION RECTAL; TOPICAL EVERY 4 HOURS
Refills: 0 | Status: DISCONTINUED | OUTPATIENT
Start: 2023-11-19 | End: 2023-11-21

## 2023-11-19 RX ORDER — HYDROCORTISONE 1 %
1 OINTMENT (GRAM) TOPICAL EVERY 6 HOURS
Refills: 0 | Status: DISCONTINUED | OUTPATIENT
Start: 2023-11-19 | End: 2023-11-21

## 2023-11-19 RX ORDER — DIBUCAINE 1 %
1 OINTMENT (GRAM) RECTAL EVERY 6 HOURS
Refills: 0 | Status: DISCONTINUED | OUTPATIENT
Start: 2023-11-19 | End: 2023-11-21

## 2023-11-19 RX ORDER — OXYCODONE HYDROCHLORIDE 5 MG/1
5 TABLET ORAL ONCE
Refills: 0 | Status: DISCONTINUED | OUTPATIENT
Start: 2023-11-19 | End: 2023-11-21

## 2023-11-19 RX ORDER — TETANUS TOXOID, REDUCED DIPHTHERIA TOXOID AND ACELLULAR PERTUSSIS VACCINE, ADSORBED 5; 2.5; 8; 8; 2.5 [IU]/.5ML; [IU]/.5ML; UG/.5ML; UG/.5ML; UG/.5ML
0.5 SUSPENSION INTRAMUSCULAR ONCE
Refills: 0 | Status: DISCONTINUED | OUTPATIENT
Start: 2023-11-19 | End: 2023-11-21

## 2023-11-19 RX ORDER — IBUPROFEN 200 MG
600 TABLET ORAL EVERY 6 HOURS
Refills: 0 | Status: COMPLETED | OUTPATIENT
Start: 2023-11-19 | End: 2024-10-17

## 2023-11-19 RX ORDER — PRAMOXINE HYDROCHLORIDE 150 MG/15G
1 AEROSOL, FOAM RECTAL EVERY 4 HOURS
Refills: 0 | Status: DISCONTINUED | OUTPATIENT
Start: 2023-11-19 | End: 2023-11-21

## 2023-11-19 RX ORDER — OXYTOCIN 10 UNIT/ML
333.33 VIAL (ML) INJECTION
Qty: 20 | Refills: 0 | Status: DISCONTINUED | OUTPATIENT
Start: 2023-11-19 | End: 2023-11-20

## 2023-11-19 RX ORDER — SODIUM CHLORIDE 9 MG/ML
3 INJECTION INTRAMUSCULAR; INTRAVENOUS; SUBCUTANEOUS EVERY 8 HOURS
Refills: 0 | Status: DISCONTINUED | OUTPATIENT
Start: 2023-11-19 | End: 2023-11-21

## 2023-11-19 RX ORDER — BENZOCAINE 10 %
1 GEL (GRAM) MUCOUS MEMBRANE EVERY 6 HOURS
Refills: 0 | Status: DISCONTINUED | OUTPATIENT
Start: 2023-11-19 | End: 2023-11-21

## 2023-11-19 RX ORDER — OXYTOCIN 10 UNIT/ML
41.67 VIAL (ML) INJECTION
Qty: 20 | Refills: 0 | Status: DISCONTINUED | OUTPATIENT
Start: 2023-11-19 | End: 2023-11-20

## 2023-11-19 RX ORDER — SODIUM CHLORIDE 9 MG/ML
1000 INJECTION, SOLUTION INTRAVENOUS
Refills: 0 | Status: DISCONTINUED | OUTPATIENT
Start: 2023-11-19 | End: 2023-11-20

## 2023-11-19 RX ORDER — INFLUENZA VIRUS VACCINE 15; 15; 15; 15 UG/.5ML; UG/.5ML; UG/.5ML; UG/.5ML
0.5 SUSPENSION INTRAMUSCULAR ONCE
Refills: 0 | Status: DISCONTINUED | OUTPATIENT
Start: 2023-11-19 | End: 2023-11-21

## 2023-11-19 RX ORDER — MAGNESIUM HYDROXIDE 400 MG/1
30 TABLET, CHEWABLE ORAL
Refills: 0 | Status: DISCONTINUED | OUTPATIENT
Start: 2023-11-19 | End: 2023-11-21

## 2023-11-19 RX ADMIN — Medication 125 MILLIUNIT(S)/MIN: at 17:46

## 2023-11-19 RX ADMIN — Medication 975 MILLIGRAM(S): at 21:05

## 2023-11-19 RX ADMIN — OXYCODONE HYDROCHLORIDE 5 MILLIGRAM(S): 5 TABLET ORAL at 16:54

## 2023-11-19 RX ADMIN — Medication 975 MILLIGRAM(S): at 20:31

## 2023-11-19 RX ADMIN — SODIUM CHLORIDE 3 MILLILITER(S): 9 INJECTION INTRAMUSCULAR; INTRAVENOUS; SUBCUTANEOUS at 22:30

## 2023-11-19 RX ADMIN — Medication 30 MILLIGRAM(S): at 17:03

## 2023-11-19 RX ADMIN — Medication 30 MILLIGRAM(S): at 15:30

## 2023-11-19 NOTE — OB PROVIDER DELIVERY SUMMARY - NSSELHIDDEN_OBGYN_ALL_OB_FT
[NS_DeliveryAttending1_OBGYN_ALL_OB_FT:[email or web address]=],[NS_DeliveryAssist1_OBGYN_ALL_OB_FT:[email or web address]=],[NS_DeliveryAssist2_OBGYN_ALL_OB_FT:[id number]=]

## 2023-11-19 NOTE — OB PROVIDER TRIAGE NOTE - HISTORY OF PRESENT ILLNESS
34 y/o  @ 39.6 wks gestation presents with c/o SROM @ 1230 moderate amount clear odorless amniotic fluid with painful uterine contractions every 3-5 minutes since 0800 states 10/10 on pain scale denies any VB reports +FM denies any n/v/d denies any fever or chills ap care uncomplicated thus far

## 2023-11-19 NOTE — OB PROVIDER H&P - NS_AMNIOTICAMT_OBGYN_ALL_OB
Palliative Care Initial Visit           Date of Visit: 8/17/2023   Visit Made By: JANKI Omer    Primary Care Physician: Barb Garcia DO  Office Phone #: [phone]  Fax Phone #: [phone]    Inpatient consult to Palliative Care  Consult performed by: Veronica Gifford APNP  Consult ordered by: Shyam Fuller APNP        Number of hospitalizations in the last year: 1     Reason for Palliative Care: Education, Patient/Family, Goals of Care, Psychosocial Support, Patient/Family and Care Coordination        Palliative Care consulted to assist with goals of care  Subjective     Chief Complaint   Patient presents with   • Wound Infection                History of Present Illness  History Obtained by: Patient, Medical Team, Chart Review and Family    74 year old female with history of HPI   DM2, PVD, chronic non healing diabetic bilateral foot ulcers, end stage renal disease on HD t/TH/S. Followed by Dr. Ortiz.         From chart review \"Patient is a 74-year-old female who is being admitted on 08/16/2023 with medical history significant for type 2 diabetes, chronic bilateral lower nonhealing diabetic foot ulcers, atherosclerotic disease of bilateral lower extremities, end-stage renal disease on HD T/T/S who is followed by podiatrist Dr. Ortiz was presenting today from clinic evaluation of left lower and right lower nonhealing diabetic foot ulcers.\"    Past Medical History  Past Medical History:   Diagnosis Date   • Acquired keratosis follicularis    • Atherosclerosis of native artery of both lower extremities with intermittent claudication (CMD)    • Contusion of right great toe without damage to nail, initial encounter    • Diabetes mellitus with diabetic dermatitis (CMD)    • Hammer toe, unspecified laterality    • Nail dystrophy    • Onychogryphosis    • Right foot ulcer, limited to breakdown of skin (CMD)    • Type 2 diabetes mellitus with diabetic peripheral angiopathy without gangrene (CMD)    •  Venous insufficiency        Past Surgical History  No past surgical history on file.    Past Social History   reports that she has never smoked. She has never used smokeless tobacco. She reports that she does not drink alcohol and does not use drugs.          Past Spiritual/Cultural History  Renee Tradition/[employer] Affiliation: [employer]             • Family and support system:   adult children  • Current living situation:  Lives at home family helps  • Home care services:  None prior to admission  • Education/occupation/hobbies:  Needs further assessment- from a large family    Past Family History  family history is not on file.    Review of Systems   Constitutional: Positive for activity change and fatigue.   Skin: Positive for wound.   Neurological: Positive for weakness.   All other systems reviewed and are negative.          Palliative Care Assessment Tools    Pain Assessment- see ROS      denies pain                   ESAS Scale  -NA       Palliative Performance Scale      in bed, needs lencho, assistance to get in wheelchair            Functional Assessment Staging (FAST)-NA       Heart Failure Tools        EKG     Objective      Allergies  Allergies   Allergen Reactions   • Penicillin G RASH   • Sinus Congestion-Pain Daytime Other (See Comments)       Medications:  Scheduled Meds: • albumin human (SPA) 25 % injection 25 g Intravenous Once   • linaGLIPtin (TRADJENTA) tablet 5 mg Oral Daily   • [Held by provider] aspirin (ECOTRIN) enteric coated tablet 81 mg Oral Daily   • atorvastatin (LIPITOR) tablet 10 mg Oral Nightly   • gabapentin (NEURONTIN) capsule 100 mg Oral Daily   • sodium chloride (PF) 0.9 % injection 2 mL Intracatheter 2 times per day   • heparin (porcine) injection 5,000 Units Subcutaneous 3 times per day   • insulin lispro (ADMELOG,HumaLOG) - Correction Dose Subcutaneous TID WC   • VANCOMYCIN - PHARMACIST MONITORED Misc Does not apply See Admin Instructions   • cefepime (MAXIPIME) 1,000 mg in  sodium chloride 0.9 % 100 mL IVPB Intravenous Daily   • metroNIDAZOLE (FLAGYL) premix IVPB 500 mg Intravenous 3 times per day   • folic acid (FOLATE) tablet 1 mg Oral Daily   • midodrine (PROAMATINE) tablet 10 mg Oral Once per day on Tue Thu Sat   • pantoprazole (PROTONIX) EC tablet 40 mg Oral Daily   • polyethylene glycol (MIRALAX) packet 17 g Oral Daily     Continuous Infusions: • sodium chloride 0.9% infusion   Intravenous     PRN Meds:   • sodium citrate anticoagulant 4 % flush 3 mL  3 mL Intracatheter PRN   • sodium chloride (NORMAL SALINE) 0.9 % bolus 100-200 mL  100-200 mL Intravenous PRN   • albumin human (SPA) 25 % injection 12.5 g  12.5 g Intravenous PRN   • sodium chloride (NORMAL SALINE) 0.9 % bolus 500 mL  500 mL Intravenous PRN   • dextrose 50 % injection 25 g  25 g Intravenous PRN   • dextrose 50 % injection 12.5 g  12.5 g Intravenous PRN   • glucagon (GLUCAGEN) injection 1 mg  1 mg Intramuscular PRN   • dextrose (GLUTOSE) 40 % gel 15 g  15 g Oral PRN   • dextrose (GLUTOSE) 40 % gel 30 g  30 g Oral PRN   • sodium chloride 0.9 % flush bag 25 mL  25 mL Intravenous PRN   • hydrALAZINE (APRESOLINE) injection 10 mg  10 mg Intravenous Q6H PRN   • acetaminophen (TYLENOL) tablet 650 mg  650 mg Oral Q4H PRN   • ondansetron (ZOFRAN) injection 4 mg  4 mg Intravenous Q12H PRN   • HYDROcodone-acetaminophen (NORCO) 5-325 MG per tablet 1 tablet  1 tablet Oral Q6H PRN       Vital Signs:   Vital Last Value (24 Hour) 24 Hour Range   Temperature (!) 101 °F (38.3 °C) (08/17/23 0809) Temp  Min: 97.5 °F (36.4 °C)  Max: 101 °F (38.3 °C)   Pulse 94 (08/17/23 0809) Pulse  Min: 80  Max: 106   Arterial   Blood Pressure   No data recorded   Non-Invasive  Blood Pressure 108/58 (08/17/23 0809) BP  Min: 95/51  Max: 124/65   Central Venous Pressure   No data recorded   Respiratory 18 (08/17/23 0809) Resp  Min: 16  Max: 18   SpO2 96 % (08/17/23 0809) SpO2  Min: 90 %  Max: 100 %   Last BM: 1 (08/17/23 0524)    Weight Trends  Wt  Readings from Last 10 Encounters:   08/16/23 63 kg (138 lb 14.2 oz)   08/16/23 63 kg (138 lb 14.2 oz)   05/15/19 68 kg (150 lb)           Physical Exam  Vitals and nursing note reviewed.   Constitutional:       Appearance: She is ill-appearing.   HENT:      Head: Normocephalic and atraumatic.      Right Ear: External ear normal.      Left Ear: External ear normal.      Nose: Nose normal.      Mouth/Throat:      Mouth: Mucous membranes are moist.      Pharynx: Oropharynx is clear.      Neck: Normal range of motion.   Eyes:      Conjunctiva/sclera: Conjunctivae normal.      Pupils: Pupils are equal, round, and reactive to light.   Cardiovascular:      Rate and Rhythm: Normal rate.      Pulses: Normal pulses.   Pulmonary:      Effort: Pulmonary effort is normal.   Abdominal:      General: Abdomen is flat.      Palpations: Abdomen is soft.   Musculoskeletal:         General: Normal range of motion.   Skin:     General: Skin is warm and dry.      Coloration: Skin is pale.   Neurological:      Mental Status: She is alert and oriented to person, place, and time. Mental status is at baseline.      Motor: Weakness present.      Gait: Gait abnormal.   Psychiatric:         Mood and Affect: Mood normal.         Thought Content: Thought content normal.         Judgment: Judgment normal.         Labs & Imaging  Recent Labs   Lab 08/17/23  0430 08/16/23  1113   SODIUM 132* 130*   POTASSIUM 3.0* 2.9*   CHLORIDE 97 94*   CO2 22 26   BUN 45* 40*   CREATININE 4.45* 3.95*   CALCIUM 9.0 10.0   ALBUMIN 1.7* 2.1*   BILIRUBIN 0.4 0.4   ALKPT 75 84   GPT <6 <6   AST 10 18   GLUCOSE 219* 112*      Recent Labs   Lab 08/17/23  0430   WBC 10.9   RBC 3.59*   HGB 9.5*   HCT 31.3*           Results for orders placed or performed during the hospital encounter of 08/16/23 (from the past 72 hour(s))   XR FOOT MIN 3 VIEWS RIGHT    Impression    IMPRESSION:      1. A soft tissue wound/ulcer is evident involving the distal and medial  aspect of  the great toe. The underlying distal phalanx exhibits significant  but partial osseous destruction. However, the osseous margins are smooth  and well-defined suggesting sequela of remote osteomyelitis with interval  healing. Therefore, no acute osteomyelitis is suspected. No soft tissue gas  is present.    2. Broad-based soft tissue irregularity/wound is present in the posterior  heel. No soft tissue gas is present. Subtle osseous lucency of the cortex  of the underlying calcaneus is evident suspicious but not confirmatory for  osteomyelitis.    3. More comprehensive assessment of the great toe and calcaneus for  potential osteomyelitis with MRI could be considered.    4. Extensive vascular calcifications.    5. Osteopenia.    Electronically Signed by: Eugenio Mak MD   Signed on: 2023 12:25 PM   Workstation ID: [id number]   MRI FOOT LEFT WO CONTRAST    Impression    IMPRESSION:    1. Findings concerning for osteomyelitis of the fourth and fifth entire  phalanx.  2. Findings concerning for early osteomyelitis of the third middle phalanx  distal condyles and the third distal phalanx.  3. Findings concerning for osteomyelitis of the first distal phalanx.  4. Additional findings as described.    Electronically Signed by: Jean Ballesteros MD   Signed on: 2023 9:16 AM   Workstation ID: [id number]   MRI FOOT RIGHT WO CONTRAST    Impression    IMPRESSION:    1. Findings concerning for osteomyelitis of the first distal phalanx and  second distal phalanx.  2. Suspected ulcer overlying the fourth digit without underlying definitive  osteomyelitis.  3. Additional findings as described.    Electronically Signed by: Jean Ballesteros MD   Signed on: 2023 9:15 AM   Workstation ID: [id number]        Advance Care Planning/Goals of Care/Discussion      Medical Decision-making capacity: Yes  POAHC/Substitute Decision Maker if appropriate: no document on file, brought blank document  Current   Code Status: Full Resuscitation       Discussion: see below      Goals of Care: see below             Assessment              Diabetic ulcer of R foot (and suspected L foot as well)  DM2 ulcers  Leukocytosis  Peripheral vascular disease/atherosclerosis of BLE  Hyperlipidemia  DM  HTN  End stage renal on hemodialysis  Hypokalemia  Hyponatremia  Protein malnutrition  Deconditioning  Albumin 1.7  Encounter for palliative care      Prognosis: high risk for re-admission  Albumin 1.7  Could qualify for hospice if she doesn't want to proceed with amputation if advised     Discussion:   Introduced palliative care. Jeanne denies pain. She is eating her tacos at lunch.  Later her son is present at bedside.     Medical situation and recent functional trajectory reviewed with family. Reviewed above listed diagnoses. Reviewed Nonhealing diabetic foot ulcer requiring BKA she says if it is a matter of life and death she would agree to BKA. Concern for bilateral issues possibly needing BKA. Will defer to Dr. Ortiz for additional details.     Life review. Patient states she enjoys time w/family  Discussed code status. Education about full code vs DNR status.   She wants to stay full code.   Reviewed HCPOA documents, blank document left for Jeanne and family to review.     Family wants to hear from Dr. Ortiz tomorrow and surgery as they are accepting of BKA if needed to prevent death.   Pt getting dialysis during 2nd visit. Son and daughter in law present during discussion and reviewed HCPOA document w/all in room. They will complete it tomorrow.     All questions answered and family verbalized understanding of above.          Plan      No new Assessment & Plan notes have been filed under this hospital service since the last note was generated.  Service: Palliative Care               Palliative care recommendations/plan (by domain)    1. Symptom Management:   • Pain: prn pain meds as appropriate  • Malnutrition: supplements recommended  2. Goals of Care: \"To  get better\"  3. Psychosocial: no needs identified   4. Spiritual: could benefit from support   5. Ethical/Legal: Needs HCPOA -blank document reviewed w/family  6. Care Transitions: likely amputation, likely bilateral BKA? Awaiting surgical and podiatry recommendations  7. Code status: Full code     8. Palliative care will continue to follow        Total combined time for today's Face to Face encounter was 40 minutes, with  time spent counseling and coordinating care regarding the above.  Additional same day NON face to face time of  32 minutes was spent on chart review, chart completion, and additional coordination of care.    Total Time 72 min  Additional time: Advanced care Planning time (not included in total time) 16 minutes.     Discussed With: Dr. Duarte, BELLA and care team, Dr. Ortiz, YAMILETH/SW    Thank you for involving Palliative Care.  Please contact the covering provider via [employer] ([place])/Page ([place]) with further questions or concerns.      Veronica Gifford MA, MSN, RN, APNP, AGPCNP-[facility]  [phone]  Answering Service: [phone]    Feel free to call  Connect for free confidential spiritual and emotional support or to complete your advanced planning document for healthcare [phone]    The patient, indicated understanding of the diagnosis and agreed with the current plan of care.  All questions were answered to the best of my ability.  The patient was encouraged to call with any interval questions or concerns. Thank you for allowing me to participate in the care of this patient.      As your provider, I truly appreciate the trust you have placed in me regarding your health. I strive to provide the best care for you and all of my patients. Your opinion is important. You may receive a phone call to complete a survey in the next few weeks. Please take a moment to complete the survey and provide feedback about your visit.           Initial Note For Department Use Only           Primary Diagnosis: Vascular  Problem List/Pertinent Diagnosis: End Stage Renal Disease, Other and Sepsis  LVAD: No  #1 Pre-Visit: needs document  #1 Post-Visit: still needs document  #2 Pre-Visit: No  #2 Post-Visit: No  #3 Pre-Visit: No  #3 Post-Visit: No          Pooling

## 2023-11-19 NOTE — LACTATION INITIAL EVALUATION - LACTATION INTERVENTIONS
initiate/review safe skin-to-skin/initiate/review hand expression/initiate/review techniques for position and latch/initiate/review breast massage/compression/reviewed components of an effective feeding and at least 8 effective feedings per day required/reviewed benefits and recommendations for rooming in/reviewed feeding on demand/by cue at least 8 times a day

## 2023-11-19 NOTE — OB RN DELIVERY SUMMARY - NSSELHIDDEN_OBGYN_ALL_OB_FT
[NS_DeliveryRN_OBGYN_ALL_OB_FT:[id number]],[NS_CirculateRN2_OBGYN_ALL_OB_FT:[id number]],[NS_DeliveryAttending1_OBGYN_ALL_OB_FT:[id number]],[NS_DeliveryAssist1_OBGYN_ALL_OB_FT:[id number]],[NS_DeliveryAssist2_OBGYN_ALL_OB_FT:[id number]]

## 2023-11-19 NOTE — OB PROVIDER H&P - ASSESSMENT
34 y/o  @ 39.6 wks gestation SROM & labor for epidural   plan of care d/w dr Carroll/ Dr Tinsley  admit to l&D  39.6 wks gestation SROM & labor for epidural   see admission orders

## 2023-11-19 NOTE — LACTATION INITIAL EVALUATION - INTERVENTION OUTCOME
Rounded on a P2 Mother because she was requesting a pump <12 hours after delivery. Mother stated that she only pumped with her first because older child did not latch on. Patient educated about infants less than 24h of age being sleepy. Patient was made aware of cluster feeding that occurs after 24h of life and to be cautious of sleep deprivation. In order to maintain infant and patient safety, patient was instructed to place infant in bassinet or call for assistance as needed. Guide to postpartum and  care book was reviewed. Patient was educated on the nutritional needs of the baby and how many wet and dirty diapers to expect. Recognition of feeding cues and to feed the baby on demand, based on cues,  and at least 8-12 times in a day was discussed. Instructed patient to wake the baby to feed if no feeding cues are seen within 3h since prior feed.  Use of feeding log to record feedings along with wet and dirty diapers was encouraged. Instructed in hand expression with good return demonstration.  Reviewed safe skin to skin. Patient verbalized understanding of  information and education given. All questions and concerns were answered./verbalizes understanding/needs met

## 2023-11-19 NOTE — OB RN TRIAGE NOTE - FALL HARM RISK - UNIVERSAL INTERVENTIONS
Bed in lowest position, wheels locked, appropriate side rails in place/Call bell, personal items and telephone in reach/Instruct patient to call for assistance before getting out of bed or chair/Non-slip footwear when patient is out of bed/[place] to call system/Physically safe environment - no spills, clutter or unnecessary equipment/Purposeful Proactive Rounding/Room/bathroom lighting operational, light cord in reach

## 2023-11-19 NOTE — OB PROVIDER DELIVERY SUMMARY - NSLOWPPHRISK_OBGYN_A_OB
[place] Pregnancy No previous uterine incision/[place] Pregnancy/Less than or equal to 4 previous vaginal births/No known bleeding disorder/No history of postpartum hemorrhage

## 2023-11-19 NOTE — OB PROVIDER H&P - NS_PROVCHECK_OBGYN_ALL_OB
*CO-PAYMENT ASSISTANCE: INITIAL NOTE*    Referral source: Pre-service-- Lillian    Reason for referral: Possible co-pay assistance      Diagnosis/ICD-10 code: Metastatic Melanoma/ C79.9    Co-payment assistance type: Medication    Insurance type: Medicare Advantage    Plan: LESLEE will review options for possible co-pay assistance      
Patient was informed of the reason for this intervention.

## 2023-11-19 NOTE — OB PROVIDER TRIAGE NOTE - NSOBPROVIDERNOTE_OBGYN_ALL_OB_FT
32 y/o  @ 39.6 wks gestation SROM & labor for epidural   plan of care d/w dr Carroll/ Dr Tinsley  admit to l&D  39.6 wks gestation SROM & labor for epidural   see admission orders

## 2023-11-19 NOTE — OB RN DELIVERY SUMMARY - NS_SEPSISRSKCALC_OBGYN_ALL_OB_FT
EOS calculated successfully. EOS Risk Factor: 0.5/1000 live births ([facility] national incidence); GA=39w6d; Temp=98.6; ROM=2.717; GBS='Negative'; Antibiotics='No antibiotics or any antibiotics < 2 hrs prior to birth'

## 2023-11-19 NOTE — OB PROVIDER DELIVERY SUMMARY - NSPROVIDERDELIVERYNOTE_OBGYN_ALL_OB_FT
Service attending    I was present and participated in  liveborn infant without complications.    Cora BUTCHER Pt became fully dilated and desired to push.   Spontaneous vaginal delivery of liveborn female.  Head, shoulders and body delivered easily. Infant was passed to mother.  Cord clamping was delayed 1 minute. Cord was cut.  Placenta delivered spontaneously intact. Uterine massage was performed and pitocin was given.  Fundus was firm. First degree laceration repaired with chromic. Good hemostasis was noted.  No other perineal, cervical, or vaginal lacerations noted. Count correct x2. Mother and baby resting comfortably.  QBL 153mL    Service attending    I was present and participated in  liveborn infant without complications.    Cora BUTCHER

## 2023-11-19 NOTE — OB PROVIDER H&P - HISTORY OF PRESENT ILLNESS
32 y/o  @ 39.6 wks gestation presents with c/o SROM @ 1230 moderate amount clear odorless amniotic fluid with painful uterine contractions every 3-5 minutes since 0800 states 10/10 on pain scale denies any VB reports +FM denies any n/v/d denies any fever or chills ap care uncomplicated thus far

## 2023-11-19 NOTE — OB PROVIDER TRIAGE NOTE - NSHPPHYSICALEXAM_GEN_ALL_CORE
abdomen: soft, nt on palp  + pooling  SVE: 7.5/90/-2  NST in progress  OB limited sono   sono images saved in ASOB  TAS vtx anterior placenta MVP:  4.42   FH- 141 bpm M-mode  EFW: 3345 grams   T(C): 37.0 (11-19-23 @ 14:11), Max: 37.0 (11-19-23 @ 14:11)  HR: 116 (11-19-23 @ 14:12) (116 - 116)  BP: 118/74 (11-19-23 @ 14:12) (118/74 - 118/74)  RR: --  SpO2: --

## 2023-11-20 DIAGNOSIS — R33.9 RETENTION OF URINE, UNSPECIFIED: ICD-10-CM

## 2023-11-20 LAB
BASOPHILS # BLD AUTO: 0.05 K/UL — SIGNIFICANT CHANGE UP (ref 0–0.2)
BASOPHILS # BLD AUTO: 0.05 K/UL — SIGNIFICANT CHANGE UP (ref 0–0.2)
BASOPHILS NFR BLD AUTO: 0.3 % — SIGNIFICANT CHANGE UP (ref 0–2)
BASOPHILS NFR BLD AUTO: 0.3 % — SIGNIFICANT CHANGE UP (ref 0–2)
EOSINOPHIL # BLD AUTO: 0.06 K/UL — SIGNIFICANT CHANGE UP (ref 0–0.5)
EOSINOPHIL # BLD AUTO: 0.06 K/UL — SIGNIFICANT CHANGE UP (ref 0–0.5)
EOSINOPHIL NFR BLD AUTO: 0.4 % — SIGNIFICANT CHANGE UP (ref 0–6)
EOSINOPHIL NFR BLD AUTO: 0.4 % — SIGNIFICANT CHANGE UP (ref 0–6)
HCT VFR BLD CALC: 34.6 % — SIGNIFICANT CHANGE UP (ref 34.5–45)
HCT VFR BLD CALC: 34.6 % — SIGNIFICANT CHANGE UP (ref 34.5–45)
HGB BLD-MCNC: 11.2 G/DL — LOW (ref 11.5–15.5)
HGB BLD-MCNC: 11.2 G/DL — LOW (ref 11.5–15.5)
IANC: 11.26 K/UL — HIGH (ref 1.8–7.4)
IANC: 11.26 K/UL — HIGH (ref 1.8–7.4)
IMM GRANULOCYTES NFR BLD AUTO: 0.6 % — SIGNIFICANT CHANGE UP (ref 0–0.9)
IMM GRANULOCYTES NFR BLD AUTO: 0.6 % — SIGNIFICANT CHANGE UP (ref 0–0.9)
LYMPHOCYTES # BLD AUTO: 18.9 % — SIGNIFICANT CHANGE UP (ref 13–44)
LYMPHOCYTES # BLD AUTO: 18.9 % — SIGNIFICANT CHANGE UP (ref 13–44)
LYMPHOCYTES # BLD AUTO: 2.91 K/UL — SIGNIFICANT CHANGE UP (ref 1–3.3)
LYMPHOCYTES # BLD AUTO: 2.91 K/UL — SIGNIFICANT CHANGE UP (ref 1–3.3)
MCHC RBC-ENTMCNC: 28.6 PG — SIGNIFICANT CHANGE UP (ref 27–34)
MCHC RBC-ENTMCNC: 28.6 PG — SIGNIFICANT CHANGE UP (ref 27–34)
MCHC RBC-ENTMCNC: 32.4 GM/DL — SIGNIFICANT CHANGE UP (ref 32–36)
MCHC RBC-ENTMCNC: 32.4 GM/DL — SIGNIFICANT CHANGE UP (ref 32–36)
MCV RBC AUTO: 88.5 FL — SIGNIFICANT CHANGE UP (ref 80–100)
MCV RBC AUTO: 88.5 FL — SIGNIFICANT CHANGE UP (ref 80–100)
MONOCYTES # BLD AUTO: 1.02 K/UL — HIGH (ref 0–0.9)
MONOCYTES # BLD AUTO: 1.02 K/UL — HIGH (ref 0–0.9)
MONOCYTES NFR BLD AUTO: 6.6 % — SIGNIFICANT CHANGE UP (ref 2–14)
MONOCYTES NFR BLD AUTO: 6.6 % — SIGNIFICANT CHANGE UP (ref 2–14)
NEUTROPHILS # BLD AUTO: 11.26 K/UL — HIGH (ref 1.8–7.4)
NEUTROPHILS # BLD AUTO: 11.26 K/UL — HIGH (ref 1.8–7.4)
NEUTROPHILS NFR BLD AUTO: 73.2 % — SIGNIFICANT CHANGE UP (ref 43–77)
NEUTROPHILS NFR BLD AUTO: 73.2 % — SIGNIFICANT CHANGE UP (ref 43–77)
NRBC # BLD: 0 /100 WBCS — SIGNIFICANT CHANGE UP (ref 0–0)
NRBC # BLD: 0 /100 WBCS — SIGNIFICANT CHANGE UP (ref 0–0)
NRBC # FLD: 0 K/UL — SIGNIFICANT CHANGE UP (ref 0–0)
NRBC # FLD: 0 K/UL — SIGNIFICANT CHANGE UP (ref 0–0)
PLATELET # BLD AUTO: 181 K/UL — SIGNIFICANT CHANGE UP (ref 150–400)
PLATELET # BLD AUTO: 181 K/UL — SIGNIFICANT CHANGE UP (ref 150–400)
RBC # BLD: 3.91 M/UL — SIGNIFICANT CHANGE UP (ref 3.8–5.2)
RBC # BLD: 3.91 M/UL — SIGNIFICANT CHANGE UP (ref 3.8–5.2)
RBC # FLD: 14.7 % — HIGH (ref 10.3–14.5)
RBC # FLD: 14.7 % — HIGH (ref 10.3–14.5)
T PALLIDUM AB TITR SER: NEGATIVE — SIGNIFICANT CHANGE UP
T PALLIDUM AB TITR SER: NEGATIVE — SIGNIFICANT CHANGE UP
WBC # BLD: 15.4 K/UL — HIGH (ref 3.8–10.5)
WBC # BLD: 15.4 K/UL — HIGH (ref 3.8–10.5)
WBC # FLD AUTO: 15.4 K/UL — HIGH (ref 3.8–10.5)
WBC # FLD AUTO: 15.4 K/UL — HIGH (ref 3.8–10.5)

## 2023-11-20 RX ADMIN — Medication 975 MILLIGRAM(S): at 21:00

## 2023-11-20 RX ADMIN — Medication 600 MILLIGRAM(S): at 04:12

## 2023-11-20 RX ADMIN — Medication 975 MILLIGRAM(S): at 09:00

## 2023-11-20 RX ADMIN — Medication 600 MILLIGRAM(S): at 00:11

## 2023-11-20 RX ADMIN — PRAMOXINE HYDROCHLORIDE 1 APPLICATION(S): 150 AEROSOL, FOAM RECTAL at 07:25

## 2023-11-20 RX ADMIN — Medication 1 TABLET(S): at 12:37

## 2023-11-20 RX ADMIN — SODIUM CHLORIDE 3 MILLILITER(S): 9 INJECTION INTRAMUSCULAR; INTRAVENOUS; SUBCUTANEOUS at 22:50

## 2023-11-20 RX ADMIN — Medication 975 MILLIGRAM(S): at 03:36

## 2023-11-20 RX ADMIN — Medication 600 MILLIGRAM(S): at 00:45

## 2023-11-20 RX ADMIN — Medication 600 MILLIGRAM(S): at 23:27

## 2023-11-20 RX ADMIN — Medication 975 MILLIGRAM(S): at 20:14

## 2023-11-20 RX ADMIN — Medication 1 APPLICATION(S): at 07:25

## 2023-11-20 RX ADMIN — SODIUM CHLORIDE 3 MILLILITER(S): 9 INJECTION INTRAMUSCULAR; INTRAVENOUS; SUBCUTANEOUS at 15:04

## 2023-11-20 RX ADMIN — Medication 600 MILLIGRAM(S): at 05:35

## 2023-11-20 RX ADMIN — Medication 600 MILLIGRAM(S): at 12:37

## 2023-11-20 RX ADMIN — Medication 975 MILLIGRAM(S): at 08:22

## 2023-11-20 RX ADMIN — Medication 600 MILLIGRAM(S): at 13:15

## 2023-11-20 RX ADMIN — AER TRAVELER 1 APPLICATION(S): 0.5 SOLUTION RECTAL; TOPICAL at 07:25

## 2023-11-20 RX ADMIN — Medication 975 MILLIGRAM(S): at 04:12

## 2023-11-20 NOTE — PROVIDER CONTACT NOTE (OTHER) - RECOMMENDATIONS
HEATON to stay in place as patient has history of extended delay-to-void after delivery. HEATON to stay in place as patient has history of extended/delay-to-void post delivery.

## 2023-11-20 NOTE — PROVIDER CONTACT NOTE (OTHER) - ASSESSMENT
Bladder extended, painful to touch,1200ml urine removed via Honeycutt catheter. Bladder distended, painful to touch,1200ml urine removed via Honeycutt catheter.

## 2023-11-20 NOTE — PROVIDER CONTACT NOTE (OTHER) - ACTION/TREATMENT ORDERED:
Honeycutt placed at 0112, 1200 ml urine removed. As per IESHA Weiss, Honeycutt will stay in place until after 0600 lab work and further determination from providers during rounding.

## 2023-11-20 NOTE — PROVIDER CONTACT NOTE (OTHER) - BACKGROUND
NSD 11/19/23 @ 1513 with 1st degree laceration. Hx: Appendectomy, NSD 2021. Patient stated she has a history of Honeycutt catheterization x 2 with first pregnancy. NSD 11/19/23 @ 1513 with 1st degree laceration. Hx: Appendectomy, NSD 2021. Patient stated she has a history of Honeycutt catheterization x 2 with prior pregnancy.

## 2023-11-20 NOTE — PROVIDER CONTACT NOTE (OTHER) - REASON
Patient unable to void, bladder scan x 2 @ 0105 = 675ml & 811ml, Honeycutt insertion. Patient unable to void/urinary retention, bladder scan x 2 @ 0105 = 675ml & 811ml, Honeycutt insertion.

## 2023-11-20 NOTE — PROGRESS NOTE ADULT - PROBLEM SELECTOR PLAN 2
-PPD#1: Urinary retention, 811cc on bladder scan, 1200cc straight cath  - Honeycutt in place, remove at 1pm (11/20)-> DTV -PPD#1: Urinary retention, 811cc on bladder scan, 1200cc straight cath  - Honeycutt in place, remove at 1am (11/21) 24 hours after placement-> DTV

## 2023-11-20 NOTE — PROGRESS NOTE ADULT - PROBLEM SELECTOR PLAN 1
- QBL: 153      - Hct: 38.5    - Pain well controlled, continue current pain regimen  - Increase ambulation, SCDs when not ambulating  - Continue regular diet - QBL: 153      - Hct: 38.5 ->34.6  - Pain well controlled, continue current pain regimen  - Increase ambulation, SCDs when not ambulating  - Continue regular diet

## 2023-11-20 NOTE — PROVIDER CONTACT NOTE (OTHER) - SITUATION
Patient has urge to void but is unable to empty bladder after multiple attempts. Patient was straight Cath during delivery.

## 2023-11-20 NOTE — PROGRESS NOTE ADULT - SUBJECTIVE AND OBJECTIVE BOX
OB Progress Note:  PPD#1    S: 34yo  PPD#1 s/p  c/b urinary retention post-partum. Patient feels well. Pain is well controlled. She is tolerating a regular diet and passing flatus. She is voiding spontaneously, and ambulating without difficulty. Denies CP/SOB. Denies lightheadedness/dizziness. Denies N/V.    O:  Vitals:  Vital Signs Last 24 Hrs  T(C): 36.6 (47), Max: 37.2 (2023 15:39)  T(F): 97.9 (), Max: 99 (2023 15:39)  HR: 85 () (79 - 226)  BP: 108/60 () (100/58 - 123/60)  BP(mean): --  RR: 18 () (16 - 18)  SpO2: 100% () (93% - 100%)    Parameters below as of   Patient On (Oxygen Delivery Method): room air        MEDICATIONS  (STANDING):  acetaminophen     Tablet .. 975 milliGRAM(s) Oral <User Schedule>  diphtheria/tetanus/pertussis (acellular) Vaccine (Adacel) 0.5 milliLiter(s) IntraMuscular once  ibuprofen  Tablet. 600 milliGRAM(s) Oral every 6 hours  influenza   Vaccine 0.5 milliLiter(s) IntraMuscular once  prenatal multivitamin 1 Tablet(s) Oral daily  sodium chloride 0.9% lock flush 3 milliLiter(s) IV Push every 8 hours      Labs:  Blood type: B Positive  Rubella IgG: RPR: Negative                          12.7   12.05<H> >-----------< 189    ( 11-19 @ 14:37 )             38.5                  Physical Exam:  General: NAD  Abdomen: soft, non-tender, non-distended, fundus firm  Vaginal: Lochia wnl  Extremities: No erythema/edema     OB Progress Note:  PPD#1    S: 32yo  PPD#1 s/p  c/b urinary retention post-partum. Patient feels well. Pain is improved. She is tolerating a regular diet and passing flatus. She is voiding spontaneously, and ambulating without difficulty. Denies CP/SOB. Denies lightheadedness/dizziness. Denies N/V.    O:  Vitals:  Vital Signs Last 24 Hrs  T(C): 36.6 (), Max: 37.2 (2023 15:39)  T(F): 97.9 (), Max: 99 (2023 15:39)  HR: 85 () (79 - 226)  BP: 108/60 () (100/58 - 123/60)  BP(mean): --  RR: 18 () (16 - 18)  SpO2: 100% () (93% - 100%)    Parameters below as of   Patient On (Oxygen Delivery Method): room air        MEDICATIONS  (STANDING):  acetaminophen     Tablet .. 975 milliGRAM(s) Oral <User Schedule>  diphtheria/tetanus/pertussis (acellular) Vaccine (Adacel) 0.5 milliLiter(s) IntraMuscular once  ibuprofen  Tablet. 600 milliGRAM(s) Oral every 6 hours  influenza   Vaccine 0.5 milliLiter(s) IntraMuscular once  prenatal multivitamin 1 Tablet(s) Oral daily  sodium chloride 0.9% lock flush 3 milliLiter(s) IV Push every 8 hours      Labs:  Blood type: B Positive  Rubella IgG: RPR: Negative                          12.7   12.05<H> >-----------< 189    ( 11-19 @ 14:37 )             38.5                  Physical Exam:  General: NAD  Abdomen: soft, non-tender, non-distended, fundus firm  Vaginal: Lochia wnl  : ivy in place with 50cc clear urine   Extremities: No erythema/edema

## 2023-11-21 ENCOUNTER — APPOINTMENT (OUTPATIENT)
Dept: OBGYN | Facility: HOSPITAL | Age: 33
End: 2023-11-21

## 2023-11-21 ENCOUNTER — TRANSCRIPTION ENCOUNTER (OUTPATIENT)
Age: 33
End: 2023-11-21

## 2023-11-21 VITALS
TEMPERATURE: 98 F | DIASTOLIC BLOOD PRESSURE: 62 MMHG | OXYGEN SATURATION: 100 % | RESPIRATION RATE: 17 BRPM | HEART RATE: 80 BPM | SYSTOLIC BLOOD PRESSURE: 112 MMHG

## 2023-11-21 RX ADMIN — Medication 975 MILLIGRAM(S): at 02:04

## 2023-11-21 RX ADMIN — Medication 975 MILLIGRAM(S): at 01:33

## 2023-11-21 RX ADMIN — Medication 600 MILLIGRAM(S): at 06:18

## 2023-11-21 RX ADMIN — Medication 600 MILLIGRAM(S): at 12:13

## 2023-11-21 RX ADMIN — SODIUM CHLORIDE 3 MILLILITER(S): 9 INJECTION INTRAMUSCULAR; INTRAVENOUS; SUBCUTANEOUS at 06:44

## 2023-11-21 RX ADMIN — Medication 600 MILLIGRAM(S): at 00:00

## 2023-11-21 RX ADMIN — Medication 600 MILLIGRAM(S): at 11:13

## 2023-11-21 NOTE — DISCHARGE NOTE OB - CARE PROVIDER_API CALL
[facility],   [address] [place] [address] [place] [address]  Phone: [phone]  Fax: (   )    -  Established Patient  Follow Up Time: 1 month

## 2023-11-21 NOTE — DISCHARGE NOTE OB - PLAN OF CARE
Make your follow-up appointment with your doctor as ordered.   No heavy lifting, driving, or strenuous activity for 6 weeks.  Nothing per vagina such as tampons, intercourse, douches or tub baths for 6 weeks or until you see your doctor.  Call your doctor if you're unable to tolerate food, increase in vaginal bleeding, or have difficulty urinating. Call your doctor if you have pain that is not relieved by your prescribed medications. Notify your doctor with any other concerns.      [facility]  Please f/u in  for a postpartum appointment in 4-6 weeks @Ambulatory Clinic Unit, [facility], Oncology Building, basement floor. Please call the office for an appointment ([phone]) Make your follow-up appointment with your doctor as ordered.   No heavy lifting, driving, or strenuous activity for 6 weeks.  Nothing per vagina such as tampons, intercourse, douches or tub baths for 6 weeks or until you see your doctor.  Call your doctor if you're unable to tolerate food, increase in vaginal bleeding, or have difficulty urinating. Call your doctor if you have pain that is not relieved by your prescribed medications. Notify your doctor with any other concerns.      [facility]  Please f/u in  for a postpartum appointment in 4-6 weeks @Ambulatory Clinic Unit, [facility], Oncology Building, basement floor. Please call the office for an appointment ([phone])

## 2023-11-21 NOTE — DISCHARGE NOTE OB - FINDINGS/TREATMENT
Make your follow-up appointment with your doctor as ordered.   No heavy lifting, driving, or strenuous activity for 6 weeks.  Nothing per vagina such as tampons, intercourse, douches or tub baths for 6 weeks or until you see your doctor.  Call your doctor if you're unable to tolerate food, increase in vaginal bleeding, or have difficulty urinating. Call your doctor if you have pain that is not relieved by your prescribed medications. Notify your doctor with any other concerns.      [facility]  Please f/u in  for a postpartum appointment in 4-6 weeks @Ambulatory Clinic Unit, [facility], Oncology Building, basement floor. Please call the office for an appointment ([phone])

## 2023-11-21 NOTE — PROGRESS NOTE ADULT - ASSESSMENT
A/P: 32yo PPD#1 s/p  c/b urinary retention.  Patient is stable post-partum.       
A/P: 34yo PPD#2 s/p  c/b postpartum urinary retention now resolved.  Patient is stable and doing well post-partum.

## 2023-11-21 NOTE — DISCHARGE NOTE OB - PROVIDER TOKENS
FREE:[LAST:[[facility]],PHONE:[phone]],FAX:[(   )    -],ADDRESS:[[address] [place] [address]],FOLLOWUP:[1 month],ESTABLISHEDPATIENT:[T]]

## 2023-11-21 NOTE — PROGRESS NOTE ADULT - ATTENDING COMMENTS
Associate Chief of L & D (Late entry)      OB Progress Note:  PPD#2    S: 32yo  PPD#2 s/p . Patient denies CP/SOB. Denies lightheadedness/dizziness. Denies N/V.    O:  Vital Signs Last 24 Hrs  T(C): 36.9 (2023 05:35), Max: 36.9 (2023 05:35)  T(F): 98.4 (2023 05:35), Max: 98.4 (2023 05:35)  HR: 79 (2023 05:35) (76 - 79)  BP: 111/57 (2023 05:35) (108/62 - 111/57)  RR: 18 (2023 05:35) (18 - 19)  SpO2: 100% (2023 05:35) (100% - 100%)    Parameters below as of 2023 05:35  Patient On (Oxygen Delivery Method): room air        MEDICATIONS  (STANDING):  acetaminophen     Tablet .. 975 milliGRAM(s) Oral <User Schedule>  diphtheria/tetanus/pertussis (acellular) Vaccine (Adacel) 0.5 milliLiter(s) IntraMuscular once  ibuprofen  Tablet. 600 milliGRAM(s) Oral every 6 hours  influenza   Vaccine 0.5 milliLiter(s) IntraMuscular once  prenatal multivitamin 1 Tablet(s) Oral daily  sodium chloride 0.9% lock flush 3 milliLiter(s) IV Push every 8 hours      Labs:  Blood type: B Positive  Rubella IgG: RPR: Negative                          11.2<L>   15.40<H> >-----------< 181    (  @ 07:10 )             34.6                        12.7   12.05<H> >-----------< 189    (  @ 14:37 )             38.5          Physical Exam:  Abdomen: soft, non-tender, non-distended, fundus firm  Vaginal: Lochia scant, sutures in situ Honeycutt  Extremities: No erythema/trace edema    A/P: 32yo PPD#2 s/p  and repair for 1st degree laceration with urinary retention- resolved    - Patient is stable for discharge and will follow up in the PP clinic    Gabriela Garcia M.D., M.B.A., M.S.
Associate Chief of L & D (Late entry)     I have met this patient for the first time today.  She was admitted and delivered by Dr Tinsley     OB Progress Note:  PPD#1    S: 32yo  PPD#1 s/p . Patient denies CP/SOB. Denies lightheadedness/dizziness. Denies N/V.    O:  Vitals:  Vital Signs Last 24 Hrs  T(C): 36.8 (2023 09:47), Max: 37.2 (2023 15:39)  T(F): 98.2 (2023 09:47), Max: 99 (2023 15:39)  HR: 83 (:47) (79 - 226)  BP: 91/50 (2023 09:47) (91/50 - 123/60)  RR: 18 (:47) (16 - 18)  SpO2: 99% (:47) (93% - 100%)    Parameters below as of 2023 09:47  Patient On (Oxygen Delivery Method): room air        MEDICATIONS  (STANDING):  acetaminophen     Tablet .. 975 milliGRAM(s) Oral <User Schedule>  diphtheria/tetanus/pertussis (acellular) Vaccine (Adacel) 0.5 milliLiter(s) IntraMuscular once  ibuprofen  Tablet. 600 milliGRAM(s) Oral every 6 hours  influenza   Vaccine 0.5 milliLiter(s) IntraMuscular once  prenatal multivitamin 1 Tablet(s) Oral daily  sodium chloride 0.9% lock flush 3 milliLiter(s) IV Push every 8 hours      Labs:  Blood type: B Positive  Rubella IgG: RPR: Negative                          11.2<L>   15.40<H> >-----------< 181    (  @ 07:10 )             34.6                        12.7   12.05<H> >-----------< 189    (  @ 14:37 )             38.5          Physical Exam:  Abdomen: soft, non-tender, non-distended, fundus firm  Vaginal: Lochia scant, sutures in situ Honeycutt  Extremities: No erythema/trace edema    A/P: 32yo PPD#1 s/p  and repair for 1st degree laceration with urinary retention with > 1 liter of urine and h/o same event in prior pregnancy    - Pain well controlled, continue current pain regimen  - Increase ambulation, SCDs when not ambulating  - Continue regular diet  - Honeycutt x 24 hours to rest bladder     Gabriela Alexander Gómez M.D., M.B.A., M.S.

## 2023-11-21 NOTE — PROGRESS NOTE ADULT - SUBJECTIVE AND OBJECTIVE BOX
OB Progress Note:  PPD#2    S: 34yo PPD#2 s/p  c/b postpartum urinary retention. Patient feels well. Pain is well controlled. She is tolerating a regular diet and passing flatus. She is voiding spontaneously, and ambulating without difficulty. Denies CP/SOB. Denies lightheadedness/dizziness. Denies N/V.    O:  Vitals:   Vital Signs Last 24 Hrs  T(C): 36.9 (2023 05:35), Max: 36.9 (2023 05:35)  T(F): 98.4 (2023 05:35), Max: 98.4 (2023 05:35)  HR: 79 (2023 05:35) (76 - 83)  BP: 111/57 (2023 05:35) (91/50 - 111/57)  BP(mean): --  RR: 18 (2023 05:35) (18 - 19)  SpO2: 100% (2023 05:35) (99% - 100%)    Parameters below as of 2023 05:35  Patient On (Oxygen Delivery Method): room air        MEDICATIONS  (STANDING):  acetaminophen     Tablet .. 975 milliGRAM(s) Oral <User Schedule>  diphtheria/tetanus/pertussis (acellular) Vaccine (Adacel) 0.5 milliLiter(s) IntraMuscular once  ibuprofen  Tablet. 600 milliGRAM(s) Oral every 6 hours  influenza   Vaccine 0.5 milliLiter(s) IntraMuscular once  prenatal multivitamin 1 Tablet(s) Oral daily  sodium chloride 0.9% lock flush 3 milliLiter(s) IV Push every 8 hours    MEDICATIONS  (PRN):  benzocaine 20%/menthol 0.5% Spray 1 Spray(s) Topical every 6 hours PRN for Perineal discomfort  dibucaine 1% Ointment 1 Application(s) Topical every 6 hours PRN Perineal discomfort  diphenhydrAMINE 25 milliGRAM(s) Oral every 6 hours PRN Pruritus  hydrocortisone 1% Cream 1 Application(s) Topical every 6 hours PRN Moderate Pain (4-6)  lanolin Ointment 1 Application(s) Topical every 6 hours PRN nipple soreness  magnesium hydroxide Suspension 30 milliLiter(s) Oral two times a day PRN Constipation  oxyCODONE    IR 5 milliGRAM(s) Oral every 3 hours PRN Moderate to Severe Pain (4-10)  oxyCODONE    IR 5 milliGRAM(s) Oral once PRN Moderate to Severe Pain (4-10)  pramoxine 1%/zinc 5% Cream 1 Application(s) Topical every 4 hours PRN Moderate Pain (4-6)  simethicone 80 milliGRAM(s) Chew every 4 hours PRN Gas  witch hazel Pads 1 Application(s) Topical every 4 hours PRN Perineal discomfort      Labs:  Blood type: B Positive  Rubella IgG: RPR: Negative                          11.2<L>   15.40<H> >-----------< 181    (  @ 07:10 )             34.6                        12.7   12.05<H> >-----------< 189    (  @ 14:37 )             38.5                  Physical Exam:  General: NAD  Abdomen: soft, non-tender, non-distended, fundus firm  Vaginal: Lochia wnl  Extremities: No erythema/edema

## 2023-11-21 NOTE — DISCHARGE NOTE OB - CARE PLAN
Principal Discharge DX:	Labor and delivery, indication for care  Assessment and plan of treatment:	Make your follow-up appointment with your doctor as ordered.   No heavy lifting, driving, or strenuous activity for 6 weeks.  Nothing per vagina such as tampons, intercourse, douches or tub baths for 6 weeks or until you see your doctor.  Call your doctor if you're unable to tolerate food, increase in vaginal bleeding, or have difficulty urinating. Call your doctor if you have pain that is not relieved by your prescribed medications. Notify your doctor with any other concerns.      [facility]  Please f/u in  for a postpartum appointment in 4-6 weeks @Ambulatory Clinic Unit, [facility], Oncology [facility] [address], [facility]. Please call the office for an appointment ([phone])  Secondary Diagnosis:	Urinary retention  Assessment and plan of treatment:	Make your follow-up appointment with your doctor as ordered.   No heavy lifting, driving, or strenuous activity for 6 weeks.  Nothing per vagina such as tampons, intercourse, douches or tub baths for 6 weeks or until you see your doctor.  Call your doctor if you're unable to tolerate food, increase in vaginal bleeding, or have difficulty urinating. Call your doctor if you have pain that is not relieved by your prescribed medications. Notify your doctor with any other concerns.      [facility]  Please f/u in  for a postpartum appointment in 4-6 weeks @Ambulatory Clinic Unit, [facility], Oncology [facility] [address], [facility]. Please call the office for an appointment ([phone])   1

## 2023-11-21 NOTE — DISCHARGE NOTE OB - NS MD DC FALL RISK RISK
For information on Fall & Injury Prevention, visit: [email or web address] OR  [email or web address] OR  https://www.cdc.gov/steadi/patient.html

## 2023-11-21 NOTE — DISCHARGE NOTE OB - INSTRUCTIONS
[facility]  [address] [place] [address] [place] [address]  [phone]

## 2023-11-21 NOTE — DISCHARGE NOTE OB - HOSPITAL COURSE
Patient was admitted to L+D for labor at term, Pt had an uncomplicated  followed by  postpartum course complicated by urinary retention. Ivy catheter was replaced and left in place for 24 hours. On PPD2, ivy was removed and patient voided spontaneously   EBL: 153  Hct: 38.5->34.6  On Postpartum day 2, patient was discharged home in stable condition, voiding spontaneously, pain well controlled, ambulating, tolerating PO and with normal vital signs. Patient's postpartum birth control plan is condoms. Pt plans to follow up in the [facility] in 6 weeks. Telephone number and clinic information provided prior to discharge.

## 2023-11-21 NOTE — DISCHARGE NOTE OB - PATIENT PORTAL LINK FT
You can access the FollowMyHealth Patient Portal offered by [facility] by registering at the following website: [email or web address]. By joining [facility]’s FollowMyHealth portal, you will also be able to view your health information using other applications (apps) compatible with our system.

## 2023-11-21 NOTE — PROGRESS NOTE ADULT - PROBLEM SELECTOR PLAN 1
- QBL: 153      - Hct: 38.5 ->34.6  - Pain well controlled, continue current pain regimen  - Increase ambulation, SCDs when not ambulating  - Continue regular diet  - Discharge planning

## 2023-11-21 NOTE — DISCHARGE NOTE OB - MEDICATION SUMMARY - MEDICATIONS TO TAKE
I will START or STAY ON the medications listed below when I get home from the hospital:    Prenatal Multivitamins with Folic Acid 1 mg oral tablet  -- 1 tab(s) by mouth once a day  -- Indication: For Post-partum

## 2024-01-04 ENCOUNTER — APPOINTMENT (OUTPATIENT)
Dept: OBGYN | Facility: HOSPITAL | Age: 34
End: 2024-01-04

## 2024-01-04 ENCOUNTER — NON-APPOINTMENT (OUTPATIENT)
Age: 34
End: 2024-01-04

## 2024-01-26 ENCOUNTER — NON-APPOINTMENT (OUTPATIENT)
Age: 34
End: 2024-01-26

## 2024-05-30 ENCOUNTER — NON-APPOINTMENT (OUTPATIENT)
Age: 34
End: 2024-05-30

## 2024-10-29 NOTE — OB RN DELIVERY SUMMARY - NSSTERILIZETYPE_OBGYN_ALL_OB
[Restricted in physically strenuous activity but ambulatory and able to carry out work of a light or sedentary nature] : Status 1- Restricted in physically strenuous activity but ambulatory and able to carry out work of a light or sedentary nature, e.g., light house work, office work [Obese] : obese [Normal] : affect appropriate [de-identified] : bilat pendulous, nipples everted, S/P Lt WLE/RT skin change None